# Patient Record
Sex: MALE | Race: WHITE | NOT HISPANIC OR LATINO | URBAN - METROPOLITAN AREA
[De-identification: names, ages, dates, MRNs, and addresses within clinical notes are randomized per-mention and may not be internally consistent; named-entity substitution may affect disease eponyms.]

---

## 2018-12-01 ENCOUNTER — INPATIENT (INPATIENT)
Facility: HOSPITAL | Age: 73
LOS: 8 days | Discharge: HOME | DRG: 330 | End: 2018-12-10
Attending: INTERNAL MEDICINE | Admitting: HOSPITALIST
Payer: MEDICARE

## 2018-12-01 ENCOUNTER — TRANSCRIPTION ENCOUNTER (OUTPATIENT)
Age: 73
End: 2018-12-01

## 2018-12-01 VITALS
DIASTOLIC BLOOD PRESSURE: 74 MMHG | HEART RATE: 84 BPM | SYSTOLIC BLOOD PRESSURE: 151 MMHG | WEIGHT: 197.09 LBS | HEIGHT: 71 IN | RESPIRATION RATE: 18 BRPM | TEMPERATURE: 98 F | OXYGEN SATURATION: 95 %

## 2018-12-01 DIAGNOSIS — R52 PAIN, UNSPECIFIED: ICD-10-CM

## 2018-12-01 DIAGNOSIS — I10 ESSENTIAL (PRIMARY) HYPERTENSION: ICD-10-CM

## 2018-12-01 DIAGNOSIS — R10.9 UNSPECIFIED ABDOMINAL PAIN: ICD-10-CM

## 2018-12-01 DIAGNOSIS — E11.9 TYPE 2 DIABETES MELLITUS WITHOUT COMPLICATIONS: ICD-10-CM

## 2018-12-01 DIAGNOSIS — E78.5 HYPERLIPIDEMIA, UNSPECIFIED: ICD-10-CM

## 2018-12-01 DIAGNOSIS — K63.89 OTHER SPECIFIED DISEASES OF INTESTINE: ICD-10-CM

## 2018-12-01 LAB
ALBUMIN SERPL ELPH-MCNC: 4.7 G/DL — SIGNIFICANT CHANGE UP (ref 3.3–5.2)
ALP SERPL-CCNC: 68 U/L — SIGNIFICANT CHANGE UP (ref 40–120)
ALT FLD-CCNC: 50 U/L — HIGH
ANION GAP SERPL CALC-SCNC: 12 MMOL/L — SIGNIFICANT CHANGE UP (ref 5–17)
APPEARANCE UR: CLEAR — SIGNIFICANT CHANGE UP
APTT BLD: 28.2 SEC — SIGNIFICANT CHANGE UP (ref 27.5–36.3)
AST SERPL-CCNC: 40 U/L — HIGH
BASOPHILS # BLD AUTO: 0 K/UL — SIGNIFICANT CHANGE UP (ref 0–0.2)
BASOPHILS NFR BLD AUTO: 0.2 % — SIGNIFICANT CHANGE UP (ref 0–2)
BILIRUB SERPL-MCNC: 0.5 MG/DL — SIGNIFICANT CHANGE UP (ref 0.4–2)
BILIRUB UR-MCNC: NEGATIVE — SIGNIFICANT CHANGE UP
BUN SERPL-MCNC: 19 MG/DL — SIGNIFICANT CHANGE UP (ref 8–20)
CALCIUM SERPL-MCNC: 9.5 MG/DL — SIGNIFICANT CHANGE UP (ref 8.6–10.2)
CHLORIDE SERPL-SCNC: 99 MMOL/L — SIGNIFICANT CHANGE UP (ref 98–107)
CO2 SERPL-SCNC: 25 MMOL/L — SIGNIFICANT CHANGE UP (ref 22–29)
COLOR SPEC: YELLOW — SIGNIFICANT CHANGE UP
CREAT SERPL-MCNC: 0.79 MG/DL — SIGNIFICANT CHANGE UP (ref 0.5–1.3)
DIFF PNL FLD: ABNORMAL
EOSINOPHIL # BLD AUTO: 0.3 K/UL — SIGNIFICANT CHANGE UP (ref 0–0.5)
EOSINOPHIL NFR BLD AUTO: 2.5 % — SIGNIFICANT CHANGE UP (ref 0–5)
GLUCOSE BLDC GLUCOMTR-MCNC: 165 MG/DL — HIGH (ref 70–99)
GLUCOSE SERPL-MCNC: 200 MG/DL — HIGH (ref 70–115)
GLUCOSE UR QL: 250 MG/DL
HCT VFR BLD CALC: 39.1 % — LOW (ref 42–52)
HGB BLD-MCNC: 13.6 G/DL — LOW (ref 14–18)
INR BLD: 1.02 RATIO — SIGNIFICANT CHANGE UP (ref 0.88–1.16)
KETONES UR-MCNC: ABNORMAL
LACTATE BLDV-MCNC: 1 MMOL/L — SIGNIFICANT CHANGE UP (ref 0.5–2)
LEUKOCYTE ESTERASE UR-ACNC: NEGATIVE — SIGNIFICANT CHANGE UP
LIDOCAIN IGE QN: 44 U/L — SIGNIFICANT CHANGE UP (ref 22–51)
LYMPHOCYTES # BLD AUTO: 1.7 K/UL — SIGNIFICANT CHANGE UP (ref 1–4.8)
LYMPHOCYTES # BLD AUTO: 15.8 % — LOW (ref 20–55)
MCHC RBC-ENTMCNC: 32.5 PG — HIGH (ref 27–31)
MCHC RBC-ENTMCNC: 34.8 G/DL — SIGNIFICANT CHANGE UP (ref 32–36)
MCV RBC AUTO: 93.5 FL — SIGNIFICANT CHANGE UP (ref 80–94)
MONOCYTES # BLD AUTO: 0.5 K/UL — SIGNIFICANT CHANGE UP (ref 0–0.8)
MONOCYTES NFR BLD AUTO: 4.6 % — SIGNIFICANT CHANGE UP (ref 3–10)
NEUTROPHILS # BLD AUTO: 8.4 K/UL — HIGH (ref 1.8–8)
NEUTROPHILS NFR BLD AUTO: 76.8 % — HIGH (ref 37–73)
NITRITE UR-MCNC: NEGATIVE — SIGNIFICANT CHANGE UP
PH UR: 6 — SIGNIFICANT CHANGE UP (ref 5–8)
PLATELET # BLD AUTO: 179 K/UL — SIGNIFICANT CHANGE UP (ref 150–400)
POTASSIUM SERPL-MCNC: 4.3 MMOL/L — SIGNIFICANT CHANGE UP (ref 3.5–5.3)
POTASSIUM SERPL-SCNC: 4.3 MMOL/L — SIGNIFICANT CHANGE UP (ref 3.5–5.3)
PROT SERPL-MCNC: 7.5 G/DL — SIGNIFICANT CHANGE UP (ref 6.6–8.7)
PROT UR-MCNC: 30 MG/DL
PROTHROM AB SERPL-ACNC: 11.7 SEC — SIGNIFICANT CHANGE UP (ref 10–12.9)
RBC # BLD: 4.18 M/UL — LOW (ref 4.6–6.2)
RBC # FLD: 12.4 % — SIGNIFICANT CHANGE UP (ref 11–15.6)
SODIUM SERPL-SCNC: 136 MMOL/L — SIGNIFICANT CHANGE UP (ref 135–145)
SP GR SPEC: 1.01 — SIGNIFICANT CHANGE UP (ref 1.01–1.02)
UROBILINOGEN FLD QL: NEGATIVE MG/DL — SIGNIFICANT CHANGE UP
WBC # BLD: 10.9 K/UL — HIGH (ref 4.8–10.8)
WBC # FLD AUTO: 10.9 K/UL — HIGH (ref 4.8–10.8)

## 2018-12-01 PROCEDURE — 74177 CT ABD & PELVIS W/CONTRAST: CPT | Mod: 26

## 2018-12-01 PROCEDURE — 44204 LAPARO PARTIAL COLECTOMY: CPT

## 2018-12-01 PROCEDURE — 99497 ADVNCD CARE PLAN 30 MIN: CPT | Mod: 25

## 2018-12-01 PROCEDURE — 99285 EMERGENCY DEPT VISIT HI MDM: CPT

## 2018-12-01 PROCEDURE — 99223 1ST HOSP IP/OBS HIGH 75: CPT

## 2018-12-01 RX ORDER — SODIUM CHLORIDE 9 MG/ML
1000 INJECTION, SOLUTION INTRAVENOUS
Qty: 0 | Refills: 0 | Status: DISCONTINUED | OUTPATIENT
Start: 2018-12-01 | End: 2018-12-03

## 2018-12-01 RX ORDER — GLUCAGON INJECTION, SOLUTION 0.5 MG/.1ML
1 INJECTION, SOLUTION SUBCUTANEOUS ONCE
Qty: 0 | Refills: 0 | Status: DISCONTINUED | OUTPATIENT
Start: 2018-12-01 | End: 2018-12-03

## 2018-12-01 RX ORDER — DEXTROSE 50 % IN WATER 50 %
25 SYRINGE (ML) INTRAVENOUS ONCE
Qty: 0 | Refills: 0 | Status: DISCONTINUED | OUTPATIENT
Start: 2018-12-01 | End: 2018-12-03

## 2018-12-01 RX ORDER — FENTANYL CITRATE 50 UG/ML
25 INJECTION INTRAVENOUS ONCE
Qty: 0 | Refills: 0 | Status: DISCONTINUED | OUTPATIENT
Start: 2018-12-01 | End: 2018-12-01

## 2018-12-01 RX ORDER — MORPHINE SULFATE 50 MG/1
2 CAPSULE, EXTENDED RELEASE ORAL
Qty: 0 | Refills: 0 | Status: DISCONTINUED | OUTPATIENT
Start: 2018-12-01 | End: 2018-12-03

## 2018-12-01 RX ORDER — ACETAMINOPHEN 500 MG
650 TABLET ORAL EVERY 6 HOURS
Qty: 0 | Refills: 0 | Status: DISCONTINUED | OUTPATIENT
Start: 2018-12-01 | End: 2018-12-03

## 2018-12-01 RX ORDER — SODIUM CHLORIDE 9 MG/ML
1000 INJECTION INTRAMUSCULAR; INTRAVENOUS; SUBCUTANEOUS
Qty: 0 | Refills: 0 | Status: DISCONTINUED | OUTPATIENT
Start: 2018-12-01 | End: 2018-12-03

## 2018-12-01 RX ORDER — DEXTROSE 50 % IN WATER 50 %
15 SYRINGE (ML) INTRAVENOUS ONCE
Qty: 0 | Refills: 0 | Status: DISCONTINUED | OUTPATIENT
Start: 2018-12-01 | End: 2018-12-03

## 2018-12-01 RX ORDER — DEXTROSE 50 % IN WATER 50 %
12.5 SYRINGE (ML) INTRAVENOUS ONCE
Qty: 0 | Refills: 0 | Status: DISCONTINUED | OUTPATIENT
Start: 2018-12-01 | End: 2018-12-03

## 2018-12-01 RX ORDER — DOCUSATE SODIUM 100 MG
100 CAPSULE ORAL THREE TIMES A DAY
Qty: 0 | Refills: 0 | Status: DISCONTINUED | OUTPATIENT
Start: 2018-12-01 | End: 2018-12-03

## 2018-12-01 RX ORDER — SODIUM CHLORIDE 9 MG/ML
3 INJECTION INTRAMUSCULAR; INTRAVENOUS; SUBCUTANEOUS ONCE
Qty: 0 | Refills: 0 | Status: COMPLETED | OUTPATIENT
Start: 2018-12-01 | End: 2018-12-01

## 2018-12-01 RX ORDER — ONDANSETRON 8 MG/1
4 TABLET, FILM COATED ORAL EVERY 6 HOURS
Qty: 0 | Refills: 0 | Status: DISCONTINUED | OUTPATIENT
Start: 2018-12-01 | End: 2018-12-03

## 2018-12-01 RX ORDER — INSULIN LISPRO 100/ML
VIAL (ML) SUBCUTANEOUS
Qty: 0 | Refills: 0 | Status: DISCONTINUED | OUTPATIENT
Start: 2018-12-01 | End: 2018-12-03

## 2018-12-01 RX ADMIN — MORPHINE SULFATE 2 MILLIGRAM(S): 50 CAPSULE, EXTENDED RELEASE ORAL at 21:33

## 2018-12-01 RX ADMIN — FENTANYL CITRATE 25 MICROGRAM(S): 50 INJECTION INTRAVENOUS at 17:04

## 2018-12-01 RX ADMIN — SODIUM CHLORIDE 3 MILLILITER(S): 9 INJECTION INTRAMUSCULAR; INTRAVENOUS; SUBCUTANEOUS at 16:01

## 2018-12-01 RX ADMIN — SODIUM CHLORIDE 100 MILLILITER(S): 9 INJECTION INTRAMUSCULAR; INTRAVENOUS; SUBCUTANEOUS at 19:14

## 2018-12-01 RX ADMIN — FENTANYL CITRATE 25 MICROGRAM(S): 50 INJECTION INTRAVENOUS at 16:13

## 2018-12-01 RX ADMIN — MORPHINE SULFATE 2 MILLIGRAM(S): 50 CAPSULE, EXTENDED RELEASE ORAL at 21:45

## 2018-12-01 RX ADMIN — SODIUM CHLORIDE 100 MILLILITER(S): 9 INJECTION INTRAMUSCULAR; INTRAVENOUS; SUBCUTANEOUS at 16:13

## 2018-12-01 RX ADMIN — FENTANYL CITRATE 25 MICROGRAM(S): 50 INJECTION INTRAVENOUS at 19:14

## 2018-12-01 RX ADMIN — Medication 100 MILLIGRAM(S): at 21:33

## 2018-12-01 NOTE — CONSULT NOTE ADULT - ASSESSMENT
74 y/o M h/o DM, HTN, gluten allergy with abdominal pain and CT finding concerning for possible colon mass. Hemodynamically stable, afebrile. Patient is admitted to medical service for abdominal pain and metastatic work-up.    Plan:  Plan for colonoscopy Monday 12/3 with Dr. Fields  Bowel prep with golytely, clear liquid diet 12/2  We are requesting cardiac clearance for colonoscopy  CEA  Rest of medical management as per primary team  Colorectal surgery will continue to follow    Discussed with colorectal surgery attending Dr. Fields.

## 2018-12-01 NOTE — ED ADULT NURSE NOTE - NSIMPLEMENTINTERV_GEN_ALL_ED
Implemented All Universal Safety Interventions:  [place] to call system. Call bell, personal items and telephone within reach. Instruct patient to call for assistance. Room bathroom lighting operational. Non-slip footwear when patient is off stretcher. Physically safe environment: no spills, clutter or unnecessary equipment. Stretcher in lowest position, wheels locked, appropriate side rails in place.

## 2018-12-01 NOTE — H&P ADULT - ASSESSMENT
72 yo male with hld, htn, bph, who is admitted for intractable abdominal pain in setting of new abdominal mass concerning for neoplastic etiology

## 2018-12-01 NOTE — ED PROVIDER NOTE - PROGRESS NOTE DETAILS
CT SIG RIGH COLONIC MASS  CR SURGERY CALLED TO SEE PT  RESULTS REVIEWED WITH PT AND HIS DAUGHTER    CONTINUE NPO AND IVF AND PAIN MEDS

## 2018-12-01 NOTE — ED PROVIDER NOTE - CONSTITUTIONAL, MLM
normal... Well appearing, well nourished, awake, alert, oriented to person, place, time/situation and in MILD apparent distress.

## 2018-12-01 NOTE — H&P ADULT - FAMILY HISTORY
No pertinent family history in first degree relatives No pertinent family history in first degree relatives, no known family history of colon or other cancers in mother or father. No known medical history of mother or father

## 2018-12-01 NOTE — H&P ADULT - HISTORY OF PRESENT ILLNESS
Pt is a 72 yo male with a pmh/o HLD, DMII on metformin, BPH, HTN, GIB with hemmorhoids, who presents to ED c/o acute onset sharp abdominal pain that radiated from central abdomen to RLQ and began at 11pm. Pt states he has never had pain before or similar to other than as a child when his appendix was removed. Pt c/o minimal nausea this AM but otherwise has had no brbpr, hematemesis, palpitations, constipation, diarrhea, cp, HA.

## 2018-12-01 NOTE — ED PROVIDER NOTE - PMH
Hyperlipidemia, unspecified hyperlipidemia type    Hypertension, unspecified type    Type 2 diabetes mellitus without complication, with long-term current use of insulin

## 2018-12-01 NOTE — ED PROVIDER NOTE - OBJECTIVE STATEMENT
PT WITH C/C ABDOMINAL PAIN  72 YO MALE FROM UC FOR ABDOMINAL PAIN. SUDDEN ONSET EARLY THIS AM RLQ PAIN. DIFFICULT TO SLEEP. NAUSEA. NO APPETITE. NO FEVER OR CHILLS . NO  SYMPTOMS. PAIN CONSTANT EPIGASTRIC  AND RLQ. SEVERE ACHE DESCRIPTION. + RADIATION RIGHT SIDE  MED HX DM HTN HLD

## 2018-12-01 NOTE — ED ADULT TRIAGE NOTE - CHIEF COMPLAINT QUOTE
Pt ambulatory in ED c/o diffuse abdominal pain since last night, went to urgent care today and sent to ED for CT scan.

## 2018-12-01 NOTE — ED STATDOCS - OBJECTIVE STATEMENT
Telemedicine assessment was conducted (using real time 2 way audio-video technology) by Dr. MARI Gu located at [address] [place] [address]  ++++++++++++++++++++++++  Pertinent patient history and initial plan:    72 y/o M pt with hx of HTN, HLD and DM presents to ED c/o severe RLQ ABD pain, which radiates diffusely to his ABD, since last night. He presented to urgent care today; and was told to present to ED to r/o appendicitis due to guarding on exam. Endorses mild nausea. Denies fever or vomiting. Telemedicine assessment was conducted (using real time 2 way audio-video technology) by Dr. MARI Gu located at [address] [place] [address]  ++++++++++++++++++++++++  Pertinent patient history and initial plan:    74 y/o M pt with hx of HTN, HLD and DM presents to ED c/o severe RLQ ABD pain, which radiates diffusely to his ABD, since last night. He presented to urgent care today; and was told to present to ED to r/o appendicitis due to guarding on exam. Endorses mild nausea. Denies fever or vomiting. when daughter presses on rlq, the patient guards. Patient seen by me in intake for initial assessment and ordering. Physician on site to follow results and further evaluate and treat patient.

## 2018-12-01 NOTE — CONSULT NOTE ADULT - SUBJECTIVE AND OBJECTIVE BOX
ACUTE CARE SURGERY CONSULT    Consulting surgical team: [facility] - Acute Care Surgery  Consulting attending: Dr. Radha Fields  Patient seen and examined: 18 @ 00:21    HPI:  74 y/o M h/o DM, HTN, gluten allergy presents to ED with right sided abdominal pain. Patient states pain started this morning and woke him from sleep. Pain has been constant. Associated nausea but no vomiting. Medications did not alleviate pain at home. Bowel movement this morning, flatus yesterday. Last colonoscopy 4-5 years ago, reportedly normal per patient. No fever, chills, chest pain, dyspnea. No recent weight loss. No headache, dizziness, weakness, numbness, or fatigue. No blood per rectum.    PMH: DM, HTN, gluten allergy  PSH: appendectomy (age 17)  Medications: does not recall, takes medications "for blood pressure, diabetes, and heart"  Allergies: NKDA, gluten  Social: denies toxic habits x3    MEDICATIONS  (STANDING):  dextrose 5%. 1000 milliLiter(s) (50 mL/Hr) IV Continuous <Continuous>  dextrose 50% Injectable 12.5 Gram(s) IV Push once  dextrose 50% Injectable 25 Gram(s) IV Push once  dextrose 50% Injectable 25 Gram(s) IV Push once  docusate sodium 100 milliGRAM(s) Oral three times a day  insulin lispro (HumaLOG) corrective regimen sliding scale   SubCutaneous three times a day before meals  sodium chloride 0.9%. 1000 milliLiter(s) (100 mL/Hr) IV Continuous <Continuous>  sodium chloride 0.9%. 1000 milliLiter(s) (100 mL/Hr) IV Continuous <Continuous>    MEDICATIONS  (PRN):  acetaminophen   Tablet .. 650 milliGRAM(s) Oral every 6 hours PRN Temp greater or equal to 38C (100.4F), Moderate Pain (4 - 6)  dextrose 40% Gel 15 Gram(s) Oral once PRN Blood Glucose LESS THAN 70 milliGRAM(s)/deciliter  glucagon  Injectable 1 milliGRAM(s) IntraMuscular once PRN Glucose LESS THAN 70 milligrams/deciliter  morphine  - Injectable 2 milliGRAM(s) IV Push four times a day PRN Severe Pain (7 - 10)  ondansetron Injectable 4 milliGRAM(s) IV Push every 6 hours PRN Nausea    VITALS & I/Os:  Vital Signs Last 24 Hrs  T(C): 36.7 (02 Dec 2018 00:07), Max: 37.1 (01 Dec 2018 21:26)  T(F): 98.1 (02 Dec 2018 00:07), Max: 98.7 (01 Dec 2018 21:26)  HR: 76 (02 Dec 2018 00:07) (71 - 84)  BP: 154/70 (02 Dec 2018 00:07) (151/74 - 156/81)  BP(mean): --  RR: 18 (02 Dec 2018 00:07) (18 - 18)  SpO2: 96% (02 Dec 2018 00:07) (95% - 97%)  CAPILLARY BLOOD GLUCOSE      POCT Blood Glucose.: 165 mg/dL (01 Dec 2018 22:27)        General: NAD, AOx3, comfortable on examination  HEENT: PERRLA, EOMI, moist mucous membranes  Neck: supple, nontender  Cardiovascular: heart RRR, no murmurs  Respiratory: no respiratory distress, CTAB  Abdomen: soft, mild RLQ tenderness, nondistended. No guarding or rebound. Normal bowel sounds.  Extremities: no peripheral edema. Normal ROM.  Integumentary: warm, no rash  Neuro: no motor or sensory deficits        LABS:                        13.6   10.9  )-----------( 179      ( 01 Dec 2018 16:08 )             39.1     12    136  |  99  |  19.0  ----------------------------<  200<H>  4.3   |  25.0  |  0.79    Ca    9.5      01 Dec 2018 16:08    TPro  7.5  /  Alb  4.7  /  TBili  0.5  /  DBili  x   /  AST  40<H>  /  ALT  50<H>  /  AlkPhos  68  12      PT/INR - ( 01 Dec 2018 16:08 )   PT: 11.7 sec;   INR: 1.02 ratio         PTT - ( 01 Dec 2018 16:08 )  PTT:28.2 sec         @ 19:31  1.0    Urinalysis Basic - ( 01 Dec 2018 16:11 )    Color: Yellow / Appearance: Clear / S.015 / pH: x  Gluc: x / Ketone: Trace  / Bili: Negative / Urobili: Negative mg/dL   Blood: x / Protein: 30 mg/dL / Nitrite: Negative   Leuk Esterase: Negative / RBC: 0-2 /HPF / WBC 0-2   Sq Epi: x / Non Sq Epi: Occasional / Bacteria: Occasional        IMAGING:  < from: CT Abdomen and Pelvis w/ Oral Cont and w/ IV Cont (18 @ 17:32) >   EXAM:  CT ABDOMEN AND PELVIS OC IC                          PROCEDURE DATE:  2018          INTERPRETATION:      Contrast enhanced CT of the abdomen and pelvis .  COMPARISON:    .    CLINICAL HISTORY:    .    Technique: contiguous axial images were obtained with 2.5 mm slice   thickness after intravenous and oral contrast administration.  Coronal and sagittal reformats were also submitted for interpretation.    100 ml of Omnipaque were injected intravenously, and 0 ml were discarded,   without complications noted.     FINDINGS:   There is a fecalization of material within the ileum to the level of the   ileocecal valve. The distal RIGHT colon shows diffuse thickening.   Carcinoma should be considered .  Thickened RIGHT colon displayedon coronal images 47 through 68 series 3   the appendix is normal.  The distal small bowel is collapsed . contrast ingested is seen within   nondilated jejunum .  The stomach and duodenal sweep are unremarkable.    The lung bases are clear.     The visualized portions of the heart are normal.    There is no free intra-abdominal air or ascites.    The liver, spleen, pancreas, adrenal glands, gallbladder are normal.    There is no intra or extrahepatic biliary ductal dilatation.      Both kidneys show normal uptake of contrast media without masses or   hydronephrosis.      The urinary bladder shows normal morphology and contour.      Prostate and seminal vesicles within normal limits.    There are no retroperitoneal masses or abnormal lymphadenopathy.  The retroperitoneal vessels are normal.      The bones and soft tissues are intact.    IMPRESSION:     Obstructing the proximal RIGHT colon lesion concerning for carcinoma   causing  dilated ileum with fecalization of content as described.       ANY DOUGLASS M.D., ATTENDING RADIOLOGIST  This document has been electronically signed. Dec  1 2018  6:18PM    < end of copied text >

## 2018-12-01 NOTE — H&P ADULT - PROBLEM SELECTOR PLAN 1
due to new finding of likely neoplastic mass  admit to any bed  pain control with opioids and bowel regimen   strict i/o's  colorectal service called by ED- colonoscopy monday? will consult again in AM to confirm  IVF for gentle hydration  clear liquid diet

## 2018-12-02 ENCOUNTER — TRANSCRIPTION ENCOUNTER (OUTPATIENT)
Age: 73
End: 2018-12-02

## 2018-12-02 DIAGNOSIS — K35.890 OTHER ACUTE APPENDICITIS WITHOUT PERFORATION OR GANGRENE: Chronic | ICD-10-CM

## 2018-12-02 LAB
ALBUMIN SERPL ELPH-MCNC: 4.2 G/DL — SIGNIFICANT CHANGE UP (ref 3.3–5.2)
ALP SERPL-CCNC: 57 U/L — SIGNIFICANT CHANGE UP (ref 40–120)
ALT FLD-CCNC: 38 U/L — SIGNIFICANT CHANGE UP
ANION GAP SERPL CALC-SCNC: 11 MMOL/L — SIGNIFICANT CHANGE UP (ref 5–17)
AST SERPL-CCNC: 33 U/L — SIGNIFICANT CHANGE UP
BASOPHILS # BLD AUTO: 0 K/UL — SIGNIFICANT CHANGE UP (ref 0–0.2)
BASOPHILS NFR BLD AUTO: 0.1 % — SIGNIFICANT CHANGE UP (ref 0–2)
BILIRUB SERPL-MCNC: 0.6 MG/DL — SIGNIFICANT CHANGE UP (ref 0.4–2)
BLD GP AB SCN SERPL QL: SIGNIFICANT CHANGE UP
BUN SERPL-MCNC: 15 MG/DL — SIGNIFICANT CHANGE UP (ref 8–20)
CALCIUM SERPL-MCNC: 8.7 MG/DL — SIGNIFICANT CHANGE UP (ref 8.6–10.2)
CEA SERPL-MCNC: 1.2 NG/ML — SIGNIFICANT CHANGE UP (ref 0–3.8)
CHLORIDE SERPL-SCNC: 98 MMOL/L — SIGNIFICANT CHANGE UP (ref 98–107)
CO2 SERPL-SCNC: 24 MMOL/L — SIGNIFICANT CHANGE UP (ref 22–29)
CREAT SERPL-MCNC: 0.68 MG/DL — SIGNIFICANT CHANGE UP (ref 0.5–1.3)
EOSINOPHIL # BLD AUTO: 0.2 K/UL — SIGNIFICANT CHANGE UP (ref 0–0.5)
EOSINOPHIL NFR BLD AUTO: 2.6 % — SIGNIFICANT CHANGE UP (ref 0–5)
GLUCOSE BLDC GLUCOMTR-MCNC: 148 MG/DL — HIGH (ref 70–99)
GLUCOSE BLDC GLUCOMTR-MCNC: 195 MG/DL — HIGH (ref 70–99)
GLUCOSE BLDC GLUCOMTR-MCNC: 196 MG/DL — HIGH (ref 70–99)
GLUCOSE SERPL-MCNC: 177 MG/DL — HIGH (ref 70–115)
HBA1C BLD-MCNC: 6.4 % — HIGH (ref 4–5.6)
HCT VFR BLD CALC: 37.8 % — LOW (ref 42–52)
HGB BLD-MCNC: 13 G/DL — LOW (ref 14–18)
LYMPHOCYTES # BLD AUTO: 1.5 K/UL — SIGNIFICANT CHANGE UP (ref 1–4.8)
LYMPHOCYTES # BLD AUTO: 16.5 % — LOW (ref 20–55)
MAGNESIUM SERPL-MCNC: 1.7 MG/DL — SIGNIFICANT CHANGE UP (ref 1.6–2.6)
MCHC RBC-ENTMCNC: 32 PG — HIGH (ref 27–31)
MCHC RBC-ENTMCNC: 34.4 G/DL — SIGNIFICANT CHANGE UP (ref 32–36)
MCV RBC AUTO: 93.1 FL — SIGNIFICANT CHANGE UP (ref 80–94)
MONOCYTES # BLD AUTO: 0.6 K/UL — SIGNIFICANT CHANGE UP (ref 0–0.8)
MONOCYTES NFR BLD AUTO: 6.1 % — SIGNIFICANT CHANGE UP (ref 3–10)
NEUTROPHILS # BLD AUTO: 6.8 K/UL — SIGNIFICANT CHANGE UP (ref 1.8–8)
NEUTROPHILS NFR BLD AUTO: 74.5 % — HIGH (ref 37–73)
PHOSPHATE SERPL-MCNC: 2.8 MG/DL — SIGNIFICANT CHANGE UP (ref 2.4–4.7)
PLATELET # BLD AUTO: 174 K/UL — SIGNIFICANT CHANGE UP (ref 150–400)
POTASSIUM SERPL-MCNC: 4 MMOL/L — SIGNIFICANT CHANGE UP (ref 3.5–5.3)
POTASSIUM SERPL-SCNC: 4 MMOL/L — SIGNIFICANT CHANGE UP (ref 3.5–5.3)
PROT SERPL-MCNC: 6.5 G/DL — LOW (ref 6.6–8.7)
RBC # BLD: 4.06 M/UL — LOW (ref 4.6–6.2)
RBC # FLD: 12.3 % — SIGNIFICANT CHANGE UP (ref 11–15.6)
SODIUM SERPL-SCNC: 133 MMOL/L — LOW (ref 135–145)
TYPE + AB SCN PNL BLD: SIGNIFICANT CHANGE UP
WBC # BLD: 9.2 K/UL — SIGNIFICANT CHANGE UP (ref 4.8–10.8)
WBC # FLD AUTO: 9.2 K/UL — SIGNIFICANT CHANGE UP (ref 4.8–10.8)

## 2018-12-02 PROCEDURE — 99232 SBSQ HOSP IP/OBS MODERATE 35: CPT

## 2018-12-02 PROCEDURE — 71250 CT THORAX DX C-: CPT | Mod: 26

## 2018-12-02 RX ORDER — SOD SULF/SODIUM/NAHCO3/KCL/PEG
4000 SOLUTION, RECONSTITUTED, ORAL ORAL ONCE
Qty: 0 | Refills: 0 | Status: DISCONTINUED | OUTPATIENT
Start: 2018-12-02 | End: 2018-12-02

## 2018-12-02 RX ORDER — ATORVASTATIN CALCIUM 80 MG/1
40 TABLET, FILM COATED ORAL AT BEDTIME
Qty: 0 | Refills: 0 | Status: DISCONTINUED | OUTPATIENT
Start: 2018-12-02 | End: 2018-12-03

## 2018-12-02 RX ORDER — LISINOPRIL 2.5 MG/1
2.5 TABLET ORAL DAILY
Qty: 0 | Refills: 0 | Status: DISCONTINUED | OUTPATIENT
Start: 2018-12-02 | End: 2018-12-03

## 2018-12-02 RX ORDER — METOPROLOL TARTRATE 50 MG
25 TABLET ORAL
Qty: 0 | Refills: 0 | Status: DISCONTINUED | OUTPATIENT
Start: 2018-12-02 | End: 2018-12-03

## 2018-12-02 RX ORDER — TAMSULOSIN HYDROCHLORIDE 0.4 MG/1
0.4 CAPSULE ORAL AT BEDTIME
Qty: 0 | Refills: 0 | Status: DISCONTINUED | OUTPATIENT
Start: 2018-12-02 | End: 2018-12-03

## 2018-12-02 RX ADMIN — MORPHINE SULFATE 2 MILLIGRAM(S): 50 CAPSULE, EXTENDED RELEASE ORAL at 02:24

## 2018-12-02 RX ADMIN — MORPHINE SULFATE 2 MILLIGRAM(S): 50 CAPSULE, EXTENDED RELEASE ORAL at 06:20

## 2018-12-02 RX ADMIN — Medication 2: at 17:30

## 2018-12-02 RX ADMIN — Medication 100 MILLIGRAM(S): at 05:56

## 2018-12-02 RX ADMIN — TAMSULOSIN HYDROCHLORIDE 0.4 MILLIGRAM(S): 0.4 CAPSULE ORAL at 23:55

## 2018-12-02 RX ADMIN — MORPHINE SULFATE 2 MILLIGRAM(S): 50 CAPSULE, EXTENDED RELEASE ORAL at 06:35

## 2018-12-02 RX ADMIN — SODIUM CHLORIDE 100 MILLILITER(S): 9 INJECTION INTRAMUSCULAR; INTRAVENOUS; SUBCUTANEOUS at 12:55

## 2018-12-02 RX ADMIN — Medication 100 MILLIGRAM(S): at 13:01

## 2018-12-02 RX ADMIN — LISINOPRIL 2.5 MILLIGRAM(S): 2.5 TABLET ORAL at 05:56

## 2018-12-02 RX ADMIN — Medication 2: at 13:00

## 2018-12-02 RX ADMIN — MORPHINE SULFATE 2 MILLIGRAM(S): 50 CAPSULE, EXTENDED RELEASE ORAL at 14:00

## 2018-12-02 RX ADMIN — MORPHINE SULFATE 2 MILLIGRAM(S): 50 CAPSULE, EXTENDED RELEASE ORAL at 12:55

## 2018-12-02 RX ADMIN — Medication 100 MILLIGRAM(S): at 23:55

## 2018-12-02 RX ADMIN — MORPHINE SULFATE 2 MILLIGRAM(S): 50 CAPSULE, EXTENDED RELEASE ORAL at 02:39

## 2018-12-02 RX ADMIN — Medication 25 MILLIGRAM(S): at 18:33

## 2018-12-02 RX ADMIN — ATORVASTATIN CALCIUM 40 MILLIGRAM(S): 80 TABLET, FILM COATED ORAL at 23:55

## 2018-12-02 NOTE — PROGRESS NOTE ADULT - ASSESSMENT
74 yo male with hld, htn, bph, who is admitted for intractable abdominal pain in setting of new abdominal mass concerning for neoplastic etiology    1. abd pain sec to colon mass- Colono on Monday by CRS. pain meds. bowel regimen. CEA.  - ECHo for Cards optimization. BP in 140s to 150s range. low dose ACEi added on admission no adding BB with hold parameters.  - CT chest- non acute. tail of pancreas nodule  2. DM2, HLD, HTN- Cont insulin regimen. BP control as above.  COnt rest of meds    ICDs

## 2018-12-02 NOTE — PROGRESS NOTE ADULT - SUBJECTIVE AND OBJECTIVE BOX
Patient c/o nausea today. Passing flatus, denies bms    Vital Signs Last 24 Hrs  T(C): 37 (02 Dec 2018 05:25), Max: 37.1 (01 Dec 2018 21:26)  T(F): 98.6 (02 Dec 2018 05:25), Max: 98.7 (01 Dec 2018 21:26)  HR: 70 (02 Dec 2018 05:25) (70 - 84)  BP: 150/70 (02 Dec 2018 05:25) (150/70 - 156/81)  RR: 18 (02 Dec 2018 05:25) (18 - 18)  SpO2: 96% (02 Dec 2018 05:25) (95% - 97%)    abd distended, no guarding, mild tenderness                          13.0   9.2   )-----------( 174      ( 02 Dec 2018 07:04 )             37.8   12-02    133<L>  |  98  |  15.0  ----------------------------<  177<H>  4.0   |  24.0  |  0.68    Ca    8.7      02 Dec 2018 07:06  Phos  2.8     12-02  Mg     1.7     12-02    TPro  6.5<L>  /  Alb  4.2  /  TBili  0.6  /  DBili  x   /  AST  33  /  ALT  38  /  AlkPhos  57  12-02

## 2018-12-02 NOTE — PROGRESS NOTE ADULT - ASSESSMENT
73M w/ 1 week hx of abd distention and diarrhea found to have colon mass on ct. Plan was to perform colonoscopy however, patient is very nauseous and only passing flatus at this time. I feel that it is safe to operative relieve the obstruction. I spoke with the patient and his daughter, Rosario, regarding the risks and benefits of surgery including infection, bleeding and injury to surrounding structures. The patient and family are agreeable at this time. 73M w/ 1 week hx of abd distention and diarrhea found to have colon mass on ct. Plan was to perform colonoscopy however, patient is very nauseous and only passing flatus at this time. I feel that it is safe to operative relieve the obstruction. He is unable to tolerate PO bowel prep. I spoke with the patient and his daughter, Rosario, regarding the risks and benefits of surgery including infection, bleeding and injury to surrounding structures. The patient and family are agreeable at this time.

## 2018-12-02 NOTE — PROGRESS NOTE ADULT - SUBJECTIVE AND OBJECTIVE BOX
HEALTH ISSUES - PROBLEM Dx:    CC- abd pain, colon mass,     INTERVAL HPI/ OVERNIGHT EVENTS:  pain controlled with meds  no new complaints      REVIEW OF SYSTEMS:    Abd pain + N/V -ve fever -ve    Vital Signs Last 24 Hrs  T(C): 37 (02 Dec 2018 05:25), Max: 37.1 (01 Dec 2018 21:26)  T(F): 98.6 (02 Dec 2018 05:25), Max: 98.7 (01 Dec 2018 21:26)  HR: 70 (02 Dec 2018 05:25) (70 - 84)  BP: 150/70 (02 Dec 2018 05:25) (150/70 - 156/81)  BP(mean): --  RR: 18 (02 Dec 2018 05:25) (18 - 18)  SpO2: 96% (02 Dec 2018 05:25) (95% - 97%)    PHYSICAL EXAM-  General: NAD, AOx3, comfortable on examination  HEENT: PERRLA, EOMI, moist mucous membranes  Neck: supple, nontender  Cardiovascular: heart RRR, no murmurs  Respiratory: no respiratory distress, CTAB  Abdomen: soft, mild RLQ tenderness, nondistended. No guarding or rebound. Normal bowel sounds.  Extremities: no peripheral edema. Normal ROM.  Integumentary: warm, no rash  Neuro: no motor or sensory deficits    LABS:                        13.0   9.2   )-----------( 174      ( 02 Dec 2018 07:04 )             37.8     12-02    133<L>  |  98  |  15.0  ----------------------------<  177<H>  4.0   |  24.0  |  0.68    Ca    8.7      02 Dec 2018 07:06  Phos  2.8     12-02  Mg     1.7     12-02    TPro  6.5<L>  /  Alb  4.2  /  TBili  0.6  /  DBili  x   /  AST  33  /  ALT  38  /  AlkPhos  57  12-02    PT/INR - ( 01 Dec 2018 16:08 )   PT: 11.7 sec;   INR: 1.02 ratio         PTT - ( 01 Dec 2018 16:08 )  PTT:28.2 sec  Urinalysis Basic - ( 01 Dec 2018 16:11 )    Color: Yellow / Appearance: Clear / S.015 / pH: x  Gluc: x / Ketone: Trace  / Bili: Negative / Urobili: Negative mg/dL   Blood: x / Protein: 30 mg/dL / Nitrite: Negative   Leuk Esterase: Negative / RBC: 0-2 /HPF / WBC 0-2   Sq Epi: x / Non Sq Epi: Occasional / Bacteria: Occasional    Assessment and Plan

## 2018-12-02 NOTE — CONSULT NOTE ADULT - ASSESSMENT
Pt is a 72 yo male with a pmh of HLD, DMII on metformin, BPH, HTN, GIB with hemmorhoids, who presents to ED c/o acute onset sharp abdominal pain that radiated from central abdomen to RLQ and began at 11pm. Pt states he has never had pain before or similar to other than as a child when his appendix was removed. Pt c/o minimal nausea this AM but otherwise has had no brbpr, hematemesis, palpitations, constipation, diarrhea, cp, HA.  CT finding concerning for possible RT colonic mass. Hemodynamically stable, afebrile. Patient is admitted to medical service for abdominal pain and metastatic work-up. CV team called for pre-colonoscopy eval. On exam hemodynamically stable w/o signs of HF. Patient denies prior MI/stent or Cv surgery. ECG SR w/o acute ischemic changes.    Based on RCRI risk index patient is at low-intermediate risk of MACE for planned low risk procedure. The patient is currently optimized from a cardiac standpoint.    Recc;  2-D echo for baseline assessment  IV lopressor 2.5-5mg mg q 4hrs samuel-procedure for HR/BP control  keep K>4 mg > 2  Start Lopressor po 25mg po q 12 with hold parameters. Pt is a 74 yo male with a pmh of HLD, DMII on metformin, BPH, HTN, GIB with hemmorhoids, who presents to ED c/o acute onset sharp abdominal pain that radiated from central abdomen to RLQ and began at 11pm. Pt states he has never had pain before or similar to other than as a child when his appendix was removed. Pt c/o minimal nausea this AM but otherwise has had no brbpr, hematemesis, palpitations, constipation, diarrhea, cp, HA.  CT finding concerning for possible RT colonic mass. Hemodynamically stable, afebrile. Patient is admitted to medical service for abdominal pain and metastatic work-up. CV team called for pre-colonoscopy eval. On exam hemodynamically stable w/o signs of HF. Patient denies prior MI/stent or Cv surgery.     Based on RCRI risk index patient is at low-intermediate risk of MACE for planned low risk procedure. The patient is currently optimized from a cardiac standpoint.    Recc;  2-D echo for baseline assessment, 12lead ECG if wnl no additional testing needed  IV lopressor 2.5-5mg mg q 4hrs samuel-procedure for HR/BP control  keep K>4 mg > 2  Start Lopressor po 25mg po q 12 with hold parameters.

## 2018-12-02 NOTE — CONSULT NOTE ADULT - SUBJECTIVE AND OBJECTIVE BOX
[facility]                                                        [facility]                                                             Office: [address] [address]                                                            Telephone: [phone]. Fax:[phone]        Patient is a 73y old  Male who presents with a chief complaint of abdominal pain (01 Dec 2018 20:16)      HPI:  Pt is a 74 yo male with a pmh ofHLD, DMII on metformin, BPH, HTN, GIB with hemmorhoids, who presents to ED c/o acute onset sharp abdominal pain that radiated from central abdomen to RLQ and began at 11pm. Pt states he has never had pain before or similar to other than as a child when his appendix was removed. Pt c/o minimal nausea this AM but otherwise has had no brbpr, hematemesis, palpitations, constipation, diarrhea, cp, HA.  CT finding concerning for possible RTlon mass. Hemodynamically stable, afebrile. Patient is admitted to medical service for abdominal pain and metastatic work-up. CV team called for pre-colonoscopy eval.        Patient denies orthopnea PND,LE edema , syncope or pre-syncope  Functional status > 10 mets  No limitations with AODL  Co-morbid: (-) DM, (_) CVA, (_) COPD (-) PE (-) DVT (-) Bleeding or clotting disorders  ECG: SR with ns st twa  no phenotypic evidence of Brs, HCM ,LQTS      PAST MEDICAL & SURGICAL HISTORY:  Hyperlipidemia, unspecified hyperlipidemia type  Hypertension, unspecified type  Type 2 diabetes mellitus without complication, with long-term current use of insulin  Other acute appendicitis      PREVIOUS DIAGNOSTIC TESTING:      < from: CT Abdomen and Pelvis w/ Oral Cont and w/ IV Cont (18 @ 17:32) >  IMPRESSION:     Obstructing the proximal RIGHT colon lesion concerning for carcinoma   causing  dilated ileum with fecalization of content as described.     < end of copied text >          MEDICATIONS  (STANDING):  atorvastatin 40 milliGRAM(s) Oral at bedtime  dextrose 5%. 1000 milliLiter(s) (50 mL/Hr) IV Continuous <Continuous>  dextrose 50% Injectable 12.5 Gram(s) IV Push once  dextrose 50% Injectable 25 Gram(s) IV Push once  dextrose 50% Injectable 25 Gram(s) IV Push once  docusate sodium 100 milliGRAM(s) Oral three times a day  insulin lispro (HumaLOG) corrective regimen sliding scale   SubCutaneous three times a day before meals  lisinopril 2.5 milliGRAM(s) Oral daily  sodium chloride 0.9%. 1000 milliLiter(s) (100 mL/Hr) IV Continuous <Continuous>  sodium chloride 0.9%. 1000 milliLiter(s) (100 mL/Hr) IV Continuous <Continuous>  tamsulosin 0.4 milliGRAM(s) Oral at bedtime    MEDICATIONS  (PRN):  acetaminophen   Tablet .. 650 milliGRAM(s) Oral every 6 hours PRN Temp greater or equal to 38C (100.4F), Moderate Pain (4 - 6)  dextrose 40% Gel 15 Gram(s) Oral once PRN Blood Glucose LESS THAN 70 milliGRAM(s)/deciliter  glucagon  Injectable 1 milliGRAM(s) IntraMuscular once PRN Glucose LESS THAN 70 milligrams/deciliter  morphine  - Injectable 2 milliGRAM(s) IV Push four times a day PRN Severe Pain (7 - 10)  ondansetron Injectable 4 milliGRAM(s) IV Push every 6 hours PRN Nausea      FAMILY HISTORY:  No pertinent family history in first degree relatives: no known family history of colon or other cancers in mother or father. No known medical history of mother or father      SOCIAL HISTORY:Lives with family    CIGARETTES:None    ALCOHOL:None    REVIEW OF SYSTEMS:  CONSTITUTIONAL: No fever, weight loss, or fatigue  EYES: No eye pain, visual disturbances, or discharge  ENMT:  No difficulty hearing, tinnitus, vertigo; No sinus or throat pain  NECK: No pain or stiffness  RESPIRATORY: No cough, wheezing, chills or hemoptysis; No Shortness of Breath  CARDIOVASCULAR: No chest pain, palpitations, passing out, dizziness, or leg swelling  GASTROINTESTINAL: No abdominal or epigastric pain. No nausea, vomiting, or hematemesis; No diarrhea or constipation. No melena or hematochezia.  GENITOURINARY: No dysuria, frequency, hematuria, or incontinence  NEUROLOGICAL: No headaches, memory loss, loss of strength, numbness, or tremors  SKIN: No itching, burning, rashes, or lesions   LYMPH Nodes: No enlarged glands  ENDOCRINE: No heat or cold intolerance; No hair loss  MUSCULOSKELETAL: No joint pain or swelling; No muscle, back, or extremity pain  PSYCHIATRIC: No depression, anxiety, mood swings, or difficulty sleeping  HEME/LYMPH: No easy bruising, or bleeding gums  ALLERY AND IMMUNOLOGIC: No hives or eczema	    Vital Signs Last 24 Hrs  T(C): 37 (02 Dec 2018 05:25), Max: 37.1 (01 Dec 2018 21:26)  T(F): 98.6 (02 Dec 2018 05:25), Max: 98.7 (01 Dec 2018 21:26)  HR: 70 (02 Dec 2018 05:25) (70 - 84)  BP: 150/70 (02 Dec 2018 05:25) (150/70 - 156/81)  BP(mean): --  RR: 18 (02 Dec 2018 05:25) (18 - 18)  SpO2: 96% (02 Dec 2018 05:25) (95% - 97%)    Daily Height in cm: 180.34 (01 Dec 2018 14:38)    Daily     I&O's Detail      PHYSICAL EXAM:  Appearance: Normal, well nourished	  HEENT:   Normal oral mucosa, PERRL, EOMI, sclera non-icteric	  Lymphatic: No cervical lymphadenopathy  Cardiovascular: Normal S1 S2, No JVD, No cardiac murmurs, No carotid bruits, No peripheral edema  Respiratory: Lungs clear to auscultation	  Psychiatry: A & O x 3, Mood & affect appropriate  Gastrointestinal:  Soft, Non-tender, + BS, no bruits	  Skin: No rashes, No ecchymoses, No cyanosis  Neurologic: Grossly non-focal with full strength in all four extremities  Extremities: Normal range of motion, No clubbing, cyanosis or edema  Vascular: Peripheral pulses palpable 2+ bilaterally      INTERPRETATION OF TELEMETRY:    ECG:    LABS:                        13.0   9.2   )-----------( 174      ( 02 Dec 2018 07:04 )             37.8     12-02    133<L>  |  98  |  15.0  ----------------------------<  177<H>  4.0   |  24.0  |  0.68    Ca    8.7      02 Dec 2018 07:06  Phos  2.8     12-02  Mg     1.7     12-02    TPro  6.5<L>  /  Alb  4.2  /  TBili  0.6  /  DBili  x   /  AST  33  /  ALT  38  /  AlkPhos  57  12-02          PT/INR - ( 01 Dec 2018 16:08 )   PT: 11.7 sec;   INR: 1.02 ratio         PTT - ( 01 Dec 2018 16:08 )  PTT:28.2 sec  Urinalysis Basic - ( 01 Dec 2018 16:11 )    Color: Yellow / Appearance: Clear / S.015 / pH: x  Gluc: x / Ketone: Trace  / Bili: Negative / Urobili: Negative mg/dL   Blood: x / Protein: 30 mg/dL / Nitrite: Negative   Leuk Esterase: Negative / RBC: 0-2 /HPF / WBC 0-2   Sq Epi: x / Non Sq Epi: Occasional / Bacteria: Occasional      I&O's Summary    BNP  RADIOLOGY & ADDITIONAL STUDIES:

## 2018-12-03 ENCOUNTER — RESULT REVIEW (OUTPATIENT)
Age: 73
End: 2018-12-03

## 2018-12-03 ENCOUNTER — APPOINTMENT (OUTPATIENT)
Dept: COLORECTAL SURGERY | Facility: HOSPITAL | Age: 73
End: 2018-12-03
Payer: MEDICARE

## 2018-12-03 LAB
BLD GP AB SCN SERPL QL: SIGNIFICANT CHANGE UP
GLUCOSE BLDC GLUCOMTR-MCNC: 129 MG/DL — HIGH (ref 70–99)
GLUCOSE BLDC GLUCOMTR-MCNC: 132 MG/DL — HIGH (ref 70–99)
GLUCOSE BLDC GLUCOMTR-MCNC: 136 MG/DL — HIGH (ref 70–99)
GLUCOSE BLDC GLUCOMTR-MCNC: 158 MG/DL — HIGH (ref 70–99)
GLUCOSE BLDC GLUCOMTR-MCNC: 182 MG/DL — HIGH (ref 70–99)
TYPE + AB SCN PNL BLD: SIGNIFICANT CHANGE UP

## 2018-12-03 PROCEDURE — 99232 SBSQ HOSP IP/OBS MODERATE 35: CPT

## 2018-12-03 PROCEDURE — 88307 TISSUE EXAM BY PATHOLOGIST: CPT | Mod: 26

## 2018-12-03 PROCEDURE — 44204 LAPARO PARTIAL COLECTOMY: CPT

## 2018-12-03 PROCEDURE — ZZZZZ: CPT

## 2018-12-03 RX ORDER — SODIUM CHLORIDE 9 MG/ML
1000 INJECTION, SOLUTION INTRAVENOUS
Qty: 0 | Refills: 0 | Status: DISCONTINUED | OUTPATIENT
Start: 2018-12-03 | End: 2018-12-04

## 2018-12-03 RX ORDER — DEXTROSE 50 % IN WATER 50 %
15 SYRINGE (ML) INTRAVENOUS ONCE
Qty: 0 | Refills: 0 | Status: DISCONTINUED | OUTPATIENT
Start: 2018-12-03 | End: 2018-12-10

## 2018-12-03 RX ORDER — ATORVASTATIN CALCIUM 80 MG/1
40 TABLET, FILM COATED ORAL AT BEDTIME
Qty: 0 | Refills: 0 | Status: DISCONTINUED | OUTPATIENT
Start: 2018-12-03 | End: 2018-12-10

## 2018-12-03 RX ORDER — DEXTROSE 50 % IN WATER 50 %
25 SYRINGE (ML) INTRAVENOUS ONCE
Qty: 0 | Refills: 0 | Status: DISCONTINUED | OUTPATIENT
Start: 2018-12-03 | End: 2018-12-10

## 2018-12-03 RX ORDER — NALOXONE HYDROCHLORIDE 4 MG/.1ML
0.1 SPRAY NASAL
Qty: 0 | Refills: 0 | Status: DISCONTINUED | OUTPATIENT
Start: 2018-12-03 | End: 2018-12-10

## 2018-12-03 RX ORDER — ALVIMOPAN 12 MG/1
12 CAPSULE ORAL
Qty: 0 | Refills: 0 | Status: DISCONTINUED | OUTPATIENT
Start: 2018-12-03 | End: 2018-12-10

## 2018-12-03 RX ORDER — TAMSULOSIN HYDROCHLORIDE 0.4 MG/1
0.4 CAPSULE ORAL AT BEDTIME
Qty: 0 | Refills: 0 | Status: DISCONTINUED | OUTPATIENT
Start: 2018-12-03 | End: 2018-12-10

## 2018-12-03 RX ORDER — HEPARIN SODIUM 5000 [USP'U]/ML
5000 INJECTION INTRAVENOUS; SUBCUTANEOUS EVERY 8 HOURS
Qty: 0 | Refills: 0 | Status: DISCONTINUED | OUTPATIENT
Start: 2018-12-03 | End: 2018-12-10

## 2018-12-03 RX ORDER — INSULIN LISPRO 100/ML
VIAL (ML) SUBCUTANEOUS
Qty: 0 | Refills: 0 | Status: DISCONTINUED | OUTPATIENT
Start: 2018-12-03 | End: 2018-12-10

## 2018-12-03 RX ORDER — LISINOPRIL 2.5 MG/1
2.5 TABLET ORAL DAILY
Qty: 0 | Refills: 0 | Status: DISCONTINUED | OUTPATIENT
Start: 2018-12-03 | End: 2018-12-08

## 2018-12-03 RX ORDER — HYDROMORPHONE HYDROCHLORIDE 2 MG/ML
0.5 INJECTION INTRAMUSCULAR; INTRAVENOUS; SUBCUTANEOUS
Qty: 0 | Refills: 0 | Status: DISCONTINUED | OUTPATIENT
Start: 2018-12-03 | End: 2018-12-03

## 2018-12-03 RX ORDER — METOPROLOL TARTRATE 50 MG
5 TABLET ORAL EVERY 6 HOURS
Qty: 0 | Refills: 0 | Status: DISCONTINUED | OUTPATIENT
Start: 2018-12-03 | End: 2018-12-03

## 2018-12-03 RX ORDER — ONDANSETRON 8 MG/1
4 TABLET, FILM COATED ORAL ONCE
Qty: 0 | Refills: 0 | Status: DISCONTINUED | OUTPATIENT
Start: 2018-12-03 | End: 2018-12-03

## 2018-12-03 RX ORDER — DEXTROSE 50 % IN WATER 50 %
12.5 SYRINGE (ML) INTRAVENOUS ONCE
Qty: 0 | Refills: 0 | Status: DISCONTINUED | OUTPATIENT
Start: 2018-12-03 | End: 2018-12-10

## 2018-12-03 RX ORDER — ONDANSETRON 8 MG/1
4 TABLET, FILM COATED ORAL EVERY 6 HOURS
Qty: 0 | Refills: 0 | Status: DISCONTINUED | OUTPATIENT
Start: 2018-12-03 | End: 2018-12-10

## 2018-12-03 RX ORDER — NALOXONE HYDROCHLORIDE 4 MG/.1ML
0.1 SPRAY NASAL
Qty: 0 | Refills: 0 | Status: DISCONTINUED | OUTPATIENT
Start: 2018-12-03 | End: 2018-12-03

## 2018-12-03 RX ORDER — HYDROMORPHONE HYDROCHLORIDE 2 MG/ML
30 INJECTION INTRAMUSCULAR; INTRAVENOUS; SUBCUTANEOUS
Qty: 0 | Refills: 0 | Status: DISCONTINUED | OUTPATIENT
Start: 2018-12-03 | End: 2018-12-04

## 2018-12-03 RX ORDER — SODIUM CHLORIDE 9 MG/ML
1000 INJECTION, SOLUTION INTRAVENOUS
Qty: 0 | Refills: 0 | Status: DISCONTINUED | OUTPATIENT
Start: 2018-12-03 | End: 2018-12-03

## 2018-12-03 RX ORDER — GLUCAGON INJECTION, SOLUTION 0.5 MG/.1ML
1 INJECTION, SOLUTION SUBCUTANEOUS ONCE
Qty: 0 | Refills: 0 | Status: DISCONTINUED | OUTPATIENT
Start: 2018-12-03 | End: 2018-12-10

## 2018-12-03 RX ORDER — ACETAMINOPHEN 500 MG
650 TABLET ORAL EVERY 6 HOURS
Qty: 0 | Refills: 0 | Status: DISCONTINUED | OUTPATIENT
Start: 2018-12-03 | End: 2018-12-10

## 2018-12-03 RX ORDER — METOPROLOL TARTRATE 50 MG
25 TABLET ORAL
Qty: 0 | Refills: 0 | Status: DISCONTINUED | OUTPATIENT
Start: 2018-12-03 | End: 2018-12-10

## 2018-12-03 RX ADMIN — ATORVASTATIN CALCIUM 40 MILLIGRAM(S): 80 TABLET, FILM COATED ORAL at 21:16

## 2018-12-03 RX ADMIN — Medication 100 MILLIGRAM(S): at 05:43

## 2018-12-03 RX ADMIN — HEPARIN SODIUM 5000 UNIT(S): 5000 INJECTION INTRAVENOUS; SUBCUTANEOUS at 21:16

## 2018-12-03 RX ADMIN — LISINOPRIL 2.5 MILLIGRAM(S): 2.5 TABLET ORAL at 05:43

## 2018-12-03 RX ADMIN — MORPHINE SULFATE 2 MILLIGRAM(S): 50 CAPSULE, EXTENDED RELEASE ORAL at 09:50

## 2018-12-03 RX ADMIN — HYDROMORPHONE HYDROCHLORIDE 30 MILLILITER(S): 2 INJECTION INTRAMUSCULAR; INTRAVENOUS; SUBCUTANEOUS at 19:02

## 2018-12-03 RX ADMIN — MORPHINE SULFATE 2 MILLIGRAM(S): 50 CAPSULE, EXTENDED RELEASE ORAL at 10:05

## 2018-12-03 RX ADMIN — TAMSULOSIN HYDROCHLORIDE 0.4 MILLIGRAM(S): 0.4 CAPSULE ORAL at 21:16

## 2018-12-03 RX ADMIN — Medication 25 MILLIGRAM(S): at 05:43

## 2018-12-03 RX ADMIN — HYDROMORPHONE HYDROCHLORIDE 30 MILLILITER(S): 2 INJECTION INTRAMUSCULAR; INTRAVENOUS; SUBCUTANEOUS at 20:48

## 2018-12-03 NOTE — PROGRESS NOTE ADULT - ASSESSMENT
74 yo male with hld, htn, bph, who is admitted for intractable abdominal pain in setting of new abdominal mass concerning for neoplastic etiology    1. Abd pain sec to colon mass- Surg by CRS. pain meds. bowel regimen. CEA noted  - CT chest- non acute. tail of pancreas nodule  2. DM2, HLD, HTN- Cont insulin regimen. BP well controlled.  Cont rest of meds. ECHO noted.    ICDs    Pt has no clear contraindication to undergo planned procedure. He is hemodynamically stable with well controlled comorbidities.

## 2018-12-03 NOTE — PROGRESS NOTE ADULT - SUBJECTIVE AND OBJECTIVE BOX
HEALTH ISSUES - PROBLEM Dx:    CC- abd pain, colon mass,     INTERVAL HPI/ OVERNIGHT EVENTS:    no complaints  awaits surgery today      REVIEW OF SYSTEMS:    Abd pain + N/V -ve fever -ve    Vital Signs Last 24 Hrs  T(C): 36.9 (03 Dec 2018 05:10), Max: 37.3 (02 Dec 2018 20:36)  T(F): 98.4 (03 Dec 2018 05:10), Max: 99.1 (02 Dec 2018 20:36)  HR: 67 (03 Dec 2018 05:10) (67 - 78)  BP: 122/66 (03 Dec 2018 05:10) (122/66 - 142/76)  BP(mean): --  RR: 18 (03 Dec 2018 05:10) (18 - 18)  SpO2: 95% (03 Dec 2018 05:10) (93% - 97%)    PHYSICAL EXAM-  General: NAD, AOx3, comfortable on examination  HEENT: PERRLA, EOMI, moist mucous membranes  Neck: supple, nontender  Cardiovascular: heart RRR, no murmurs  Respiratory: no respiratory distress, CTAB  Abdomen: soft, mild RLQ tenderness, nondistended. No guarding or rebound. Normal bowel sounds.  Extremities: no peripheral edema. Normal ROM.  Integumentary: warm, no rash  Neuro: no motor or sensory deficits    LABS:                        13.0   9.2   )-----------( 174      ( 02 Dec 2018 07:04 )             37.8     12-02    133<L>  |  98  |  15.0  ----------------------------<  177<H>  4.0   |  24.0  |  0.68    Ca    8.7      02 Dec 2018 07:06  Phos  2.8     12-  Mg     1.7     12-02    TPro  6.5<L>  /  Alb  4.2  /  TBili  0.6  /  DBili  x   /  AST  33  /  ALT  38  /  AlkPhos  57  12-02    PT/INR - ( 01 Dec 2018 16:08 )   PT: 11.7 sec;   INR: 1.02 ratio         PTT - ( 01 Dec 2018 16:08 )  PTT:28.2 sec  Urinalysis Basic - ( 01 Dec 2018 16:11 )    Color: Yellow / Appearance: Clear / S.015 / pH: x  Gluc: x / Ketone: Trace  / Bili: Negative / Urobili: Negative mg/dL   Blood: x / Protein: 30 mg/dL / Nitrite: Negative   Leuk Esterase: Negative / RBC: 0-2 /HPF / WBC 0-2   Sq Epi: x / Non Sq Epi: Occasional / Bacteria: Occasional    Assessment and Plan

## 2018-12-03 NOTE — BRIEF OPERATIVE NOTE - PROCEDURE
<<-----Click on this checkbox to enter Procedure Ventral hernia repair  12/03/2018    Active  [id number]  Ileocolic resection  12/03/2018    Active  [id number]  Exploratory laparotomy  12/03/2018    Active  [id number]  Lysis of adhesions  12/03/2018    Active  [id number]

## 2018-12-04 DIAGNOSIS — L76.82 OTHER POSTPROCEDURAL COMPLICATIONS OF SKIN AND SUBCUTANEOUS TISSUE: ICD-10-CM

## 2018-12-04 DIAGNOSIS — R52 PAIN, UNSPECIFIED: ICD-10-CM

## 2018-12-04 DIAGNOSIS — G89.18 OTHER ACUTE POSTPROCEDURAL PAIN: ICD-10-CM

## 2018-12-04 LAB
ANION GAP SERPL CALC-SCNC: 16 MMOL/L — SIGNIFICANT CHANGE UP (ref 5–17)
BUN SERPL-MCNC: 17 MG/DL — SIGNIFICANT CHANGE UP (ref 8–20)
CALCIUM SERPL-MCNC: 8.4 MG/DL — LOW (ref 8.6–10.2)
CHLORIDE SERPL-SCNC: 104 MMOL/L — SIGNIFICANT CHANGE UP (ref 98–107)
CO2 SERPL-SCNC: 19 MMOL/L — LOW (ref 22–29)
CREAT SERPL-MCNC: 0.91 MG/DL — SIGNIFICANT CHANGE UP (ref 0.5–1.3)
EOSINOPHIL # BLD AUTO: 0 K/UL — SIGNIFICANT CHANGE UP (ref 0–0.5)
EOSINOPHIL NFR BLD AUTO: 0 % — SIGNIFICANT CHANGE UP (ref 0–5)
GLUCOSE BLDC GLUCOMTR-MCNC: 188 MG/DL — HIGH (ref 70–99)
GLUCOSE BLDC GLUCOMTR-MCNC: 232 MG/DL — HIGH (ref 70–99)
GLUCOSE BLDC GLUCOMTR-MCNC: 239 MG/DL — HIGH (ref 70–99)
GLUCOSE BLDC GLUCOMTR-MCNC: 248 MG/DL — HIGH (ref 70–99)
GLUCOSE SERPL-MCNC: 202 MG/DL — HIGH (ref 70–115)
HCT VFR BLD CALC: 36.3 % — LOW (ref 42–52)
HGB BLD-MCNC: 12.6 G/DL — LOW (ref 14–18)
LYMPHOCYTES # BLD AUTO: 1 K/UL — SIGNIFICANT CHANGE UP (ref 1–4.8)
LYMPHOCYTES # BLD AUTO: 9.4 % — LOW (ref 20–55)
MAGNESIUM SERPL-MCNC: 1.6 MG/DL — SIGNIFICANT CHANGE UP (ref 1.6–2.6)
MCHC RBC-ENTMCNC: 32.7 PG — HIGH (ref 27–31)
MCHC RBC-ENTMCNC: 34.7 G/DL — SIGNIFICANT CHANGE UP (ref 32–36)
MCV RBC AUTO: 94.3 FL — HIGH (ref 80–94)
MONOCYTES # BLD AUTO: 0.8 K/UL — SIGNIFICANT CHANGE UP (ref 0–0.8)
MONOCYTES NFR BLD AUTO: 7.4 % — SIGNIFICANT CHANGE UP (ref 3–10)
NEUTROPHILS # BLD AUTO: 8.9 K/UL — HIGH (ref 1.8–8)
NEUTROPHILS NFR BLD AUTO: 83 % — HIGH (ref 37–73)
PHOSPHATE SERPL-MCNC: 4.1 MG/DL — SIGNIFICANT CHANGE UP (ref 2.4–4.7)
PLATELET # BLD AUTO: 183 K/UL — SIGNIFICANT CHANGE UP (ref 150–400)
POTASSIUM SERPL-MCNC: 3.6 MMOL/L — SIGNIFICANT CHANGE UP (ref 3.5–5.3)
POTASSIUM SERPL-SCNC: 3.6 MMOL/L — SIGNIFICANT CHANGE UP (ref 3.5–5.3)
RBC # BLD: 3.85 M/UL — LOW (ref 4.6–6.2)
RBC # FLD: 12.7 % — SIGNIFICANT CHANGE UP (ref 11–15.6)
SODIUM SERPL-SCNC: 139 MMOL/L — SIGNIFICANT CHANGE UP (ref 135–145)
WBC # BLD: 10.7 K/UL — SIGNIFICANT CHANGE UP (ref 4.8–10.8)
WBC # FLD AUTO: 10.7 K/UL — SIGNIFICANT CHANGE UP (ref 4.8–10.8)

## 2018-12-04 PROCEDURE — 99233 SBSQ HOSP IP/OBS HIGH 50: CPT

## 2018-12-04 RX ORDER — HYDROMORPHONE HYDROCHLORIDE 2 MG/ML
0.5 INJECTION INTRAMUSCULAR; INTRAVENOUS; SUBCUTANEOUS EVERY 4 HOURS
Qty: 0 | Refills: 0 | Status: DISCONTINUED | OUTPATIENT
Start: 2018-12-04 | End: 2018-12-05

## 2018-12-04 RX ORDER — ACETAMINOPHEN 500 MG
1000 TABLET ORAL ONCE
Qty: 0 | Refills: 0 | Status: COMPLETED | OUTPATIENT
Start: 2018-12-04 | End: 2018-12-04

## 2018-12-04 RX ORDER — ACETAMINOPHEN 500 MG
1000 TABLET ORAL ONCE
Qty: 0 | Refills: 0 | Status: COMPLETED | OUTPATIENT
Start: 2018-12-05 | End: 2018-12-05

## 2018-12-04 RX ORDER — SODIUM CHLORIDE 9 MG/ML
500 INJECTION, SOLUTION INTRAVENOUS
Qty: 0 | Refills: 0 | Status: DISCONTINUED | OUTPATIENT
Start: 2018-12-04 | End: 2018-12-06

## 2018-12-04 RX ORDER — HYDROMORPHONE HYDROCHLORIDE 2 MG/ML
30 INJECTION INTRAMUSCULAR; INTRAVENOUS; SUBCUTANEOUS
Qty: 0 | Refills: 0 | Status: DISCONTINUED | OUTPATIENT
Start: 2018-12-04 | End: 2018-12-05

## 2018-12-04 RX ADMIN — HYDROMORPHONE HYDROCHLORIDE 30 MILLILITER(S): 2 INJECTION INTRAMUSCULAR; INTRAVENOUS; SUBCUTANEOUS at 19:21

## 2018-12-04 RX ADMIN — ALVIMOPAN 12 MILLIGRAM(S): 12 CAPSULE ORAL at 17:18

## 2018-12-04 RX ADMIN — ALVIMOPAN 12 MILLIGRAM(S): 12 CAPSULE ORAL at 05:51

## 2018-12-04 RX ADMIN — HEPARIN SODIUM 5000 UNIT(S): 5000 INJECTION INTRAVENOUS; SUBCUTANEOUS at 22:13

## 2018-12-04 RX ADMIN — HEPARIN SODIUM 5000 UNIT(S): 5000 INJECTION INTRAVENOUS; SUBCUTANEOUS at 05:53

## 2018-12-04 RX ADMIN — Medication 400 MILLIGRAM(S): at 17:19

## 2018-12-04 RX ADMIN — Medication 4: at 17:19

## 2018-12-04 RX ADMIN — Medication 25 MILLIGRAM(S): at 05:53

## 2018-12-04 RX ADMIN — Medication 1000 MILLIGRAM(S): at 22:28

## 2018-12-04 RX ADMIN — LISINOPRIL 2.5 MILLIGRAM(S): 2.5 TABLET ORAL at 05:51

## 2018-12-04 RX ADMIN — Medication 400 MILLIGRAM(S): at 22:13

## 2018-12-04 RX ADMIN — HYDROMORPHONE HYDROCHLORIDE 30 MILLILITER(S): 2 INJECTION INTRAMUSCULAR; INTRAVENOUS; SUBCUTANEOUS at 07:36

## 2018-12-04 RX ADMIN — Medication 1000 MILLIGRAM(S): at 17:50

## 2018-12-04 RX ADMIN — Medication 25 MILLIGRAM(S): at 17:18

## 2018-12-04 RX ADMIN — TAMSULOSIN HYDROCHLORIDE 0.4 MILLIGRAM(S): 0.4 CAPSULE ORAL at 22:13

## 2018-12-04 RX ADMIN — Medication 4: at 13:41

## 2018-12-04 RX ADMIN — HEPARIN SODIUM 5000 UNIT(S): 5000 INJECTION INTRAVENOUS; SUBCUTANEOUS at 13:42

## 2018-12-04 RX ADMIN — Medication 4: at 05:57

## 2018-12-04 RX ADMIN — ATORVASTATIN CALCIUM 40 MILLIGRAM(S): 80 TABLET, FILM COATED ORAL at 22:13

## 2018-12-04 NOTE — PROGRESS NOTE ADULT - SUBJECTIVE AND OBJECTIVE BOX
HPI:  Pt is a 74 yo male with a pmh/o HLD, DMII on metformin, BPH, HTN, GIB with hemmorhoids, who presents to ED c/o acute onset sharp abdominal pain that radiated from central abdomen to RLQ and began at 11pm. Pt states he has never had pain before or similar to other than as a child when his appendix was removed. Pt c/o minimal nausea this AM but otherwise has had no brbpr, hematemesis, palpitations, constipation, diarrhea, cp, HA. (01 Dec 2018 20:16)    He is now POD #1 ExLap, REAL, Ileocolic resection, Ventral hernia repair on IV PCA for pain management.      VERBAL REPORT: "When I get it, the medicine helps, but it's running out."  Patient reports midline abdominal incision pain. RN confirms that patient has been pressing frequently and hitting his 4 hour limit.  PAIN SCORE: 5/10   (VNRS)      Allergies  Gluten (Anaphylaxis)  No Known Drug Allergies      THERAPY:  [ ] IV PCA Morphine		[ ] 5 mg/mL	[ ] 1 mg/mL  [X] IV PCA Hydromorphone	[ ] 5 mg/mL	[X] 1 mg/mL  [ ] IV PCA Fentanyl		[ ] 50 micrograms/mL  Demand dose _0.2mg_ lockout _8_ (minutes) Continuous Rate _0_ Total: _7.4mg_  Daily      MEDICATIONS  (STANDING):  acetaminophen  IVPB .. 1000 milliGRAM(s) IV Intermittent once  acetaminophen  IVPB .. 1000 milliGRAM(s) IV Intermittent once  alvimopan 12 milliGRAM(s) Oral two times a day  atorvastatin 40 milliGRAM(s) Oral at bedtime  dextrose 50% Injectable 12.5 Gram(s) IV Push once  dextrose 50% Injectable 25 Gram(s) IV Push once  dextrose 50% Injectable 25 Gram(s) IV Push once  heparin  Injectable 5000 Unit(s) SubCutaneous every 8 hours  HYDROmorphone PCA (1 mG/mL) 30 milliLiter(s) PCA Continuous PCA Continuous  insulin lispro (HumaLOG) corrective regimen sliding scale   SubCutaneous three times a day before meals  lactated ringers. 500 milliLiter(s) (84 mL/Hr) IV Continuous <Continuous>  lisinopril 2.5 milliGRAM(s) Oral daily  metoprolol tartrate 25 milliGRAM(s) Oral two times a day  tamsulosin 0.4 milliGRAM(s) Oral at bedtime    MEDICATIONS  (PRN):  acetaminophen   Tablet .. 650 milliGRAM(s) Oral every 6 hours PRN Temp greater or equal to 38C (100.4F), Moderate Pain (4 - 6)  dextrose 40% Gel 15 Gram(s) Oral once PRN Blood Glucose LESS THAN 70 milliGRAM(s)/deciliter  glucagon  Injectable 1 milliGRAM(s) IntraMuscular once PRN Glucose LESS THAN 70 milligrams/deciliter  HYDROmorphone  Injectable 0.5 milliGRAM(s) IV Push every 4 hours PRN Severe Pain unrelieved by PCA  naloxone Injectable 0.1 milliGRAM(s) IV Push every 3 minutes PRN For ANY of the following changes in patient status:  A. RR LESS THAN 10 breaths per minute, B. Oxygen saturation LESS THAN 90%, C. Sedation score of 6  ondansetron Injectable 4 milliGRAM(s) IV Push every 6 hours PRN Nausea      OBJECTIVE:  Sedation Score:	[X] Alert	[ ] Drowsy	[ ] Arousable	[ ] Asleep	[ ] Unresponsive  Side Effects:	[X] None	[ ] Nausea	[ ] Vomiting	[ ] Pruritus	  [ ] Weakness		[ ] Numbness	[ ] Other:      PHYSICAL EXAM:  GENERAL: NAD, well-developed  HEAD:  Atraumatic, Normocephalic  EYES: EOMI, PERRLA, conjunctiva and sclera clear  NERVOUS SYSTEM:  Alert & Oriented X3, Good concentration; Motor Strength 5/5 B/L upper and lower extremities  CHEST/LUNG: Clear speech, no SOB  ABDOMEN: Incisional Tenderness; s/p NG Tube, NPO        LABS:                        12.6   10.7  )-----------( 183      ( 04 Dec 2018 07:25 )             36.3       12-04    139  |  104  |  17.0  ----------------------------<  202<H>  3.6   |  19.0<L>  |  0.91    Ca    8.4<L>      04 Dec 2018 07:25  Phos  4.1     12-04  Mg     1.6     12-04

## 2018-12-04 NOTE — PROGRESS NOTE ADULT - ASSESSMENT
74 yo male with hld, htn, bph, who is admitted for intractable abdominal pain in setting of new abdominal mass concerning for neoplastic etiology    1. Abd pain sec to colon mass- now s/p ex-lap, R ileocolonic resection, and ventral hernia repair. pain meds per pain mngmt. NGT removal and ice chips allowed per CRS today. no BMs yet.     2. DM2, HLD, HTN- Cont insulin regimen. BP well controlled.  Cont rest of meds. ECHO noted.    ICDs 74 yo male with hld, htn, bph, who is admitted for intractable abdominal pain in setting of new abdominal mass concerning for neoplastic etiology    1. Abd pain sec to colon mass- now s/p ex-lap, R ileocolonic resection, and ventral hernia repair. pain meds per pain mngmt. NGT removal and ice chips allowed per CRS today. no BMs yet.     2. HLD, HTN- BP well controlled.  Cont rest of meds. ECHO noted.    3. DM2- uncontrolled accuchecks. but since post op and no PO intake yet will remain on HISS.    ICDs

## 2018-12-04 NOTE — PROGRESS NOTE ADULT - SUBJECTIVE AND OBJECTIVE BOX
73 year old Male, S/P Right Hemicolectomy under GETA on 12/3/18. Patient is resting comfortably, vital signs are stable. Patient is in no distress. Patient had no complaints or complications with regard to Anesthesia Care.

## 2018-12-04 NOTE — PROGRESS NOTE ADULT - PROBLEM SELECTOR PLAN 1
1. Setting Modified  - Reduce lockout to 6mins  - Increase demand to 0.25mg  - Increase 4 hour Limit to 5mg

## 2018-12-04 NOTE — PROGRESS NOTE ADULT - ASSESSMENT
73 year old male found to have R colon mass on CT now s/p ex lap,  R ileocolonic resection, and ventral hernia repair recovering well post-operatively. Hemodynamically stable.     - serial abdominal exams  - monitor bowel function  - follow up pathology   - will likely d/c whitney and NGT 12/4 AM  - Pain control with dPCA  - Entereg  - O2 on 2L NC  - IVF @125cc  - encourage ambulation  - DVT ppx with SCDs and lovenox

## 2018-12-04 NOTE — PROGRESS NOTE ADULT - ASSESSMENT
72 yo male, now POD #1 Ex Lap, REAL, Ileocolic resection, ventral hernia repair. Found A&Ox3, NAD, sitting up in chair. Pain appreciated. No signs of oversedation or intense pain. Discussed modifying PCA settings to maximize analgesia.     PLAN    Therapy to  be:	[X] Continue   [ ] Discontinued   [ ] Change to prn Analgesics    Documentation and Verification of current medications:  [X] Done	[ ] Not done, not eligible  [ ] Not done, reason not given    [X]  [facility] Reviewed. Reference # [id number]

## 2018-12-04 NOTE — PROGRESS NOTE ADULT - SUBJECTIVE AND OBJECTIVE BOX
HEALTH ISSUES - PROBLEM Dx:    now s/p ex-lap, R ileocolonic resection, and ventral hernia repair for intestinal mass    INTERVAL HPI/ OVERNIGHT EVENTS:    c/o pain; says PCA not working; asking to eat ice chips and drink sips of water    REVIEW OF SYSTEMS:    fever -ve, pain +ve Bm -ve flatus -ve    Vital Signs Last 24 Hrs  T(C): 36.8 (04 Dec 2018 08:24), Max: 38.1 (04 Dec 2018 04:35)  T(F): 98.3 (04 Dec 2018 08:24), Max: 100.5 (04 Dec 2018 04:35)  HR: 74 (04 Dec 2018 08:24) (64 - 96)  BP: 107/56 (04 Dec 2018 08:24) (107/56 - 167/77)  BP(mean): --  RR: 18 (04 Dec 2018 08:24) (15 - 18)  SpO2: 98% (04 Dec 2018 08:24) (94% - 98%)    PHYSICAL EXAM-  General: NAD, AOx3, comfortable on examination  HEENT: PERRLA, EOMI, moist mucous membranes  Neck: supple, nontender  Cardiovascular: heart RRR, no murmurs  Respiratory: no respiratory distress, CTAB  Abdomen: soft, surgical site tenderness nondistended. No BS. dressing C/D/I   Extremities: no peripheral edema. Normal ROM.  Integumentary: warm, no rash  Neuro: no motor or sensory deficits    LABS:                        12.6   10.7  )-----------( 183      ( 04 Dec 2018 07:25 )             36.3     12-04    139  |  104  |  17.0  ----------------------------<  202<H>  3.6   |  19.0<L>  |  0.91    Ca    8.4<L>      04 Dec 2018 07:25  Phos  4.1     12-04  Mg     1.6     12-04    Assessment and Plan

## 2018-12-04 NOTE — PROGRESS NOTE ADULT - SUBJECTIVE AND OBJECTIVE BOX
Progress/Post-Op note:    Patient now s/p ex-lap, R ileocolonic resection, and ventral hernia repair. VSS, afebrile. Resting comfortably in bed. Pain well controlled on dPCA. Whitney in place, 150 cc UOP. NGT in place, minimal output.  Denies nausea, vomiting, fever, chills. No BM, no flatus.     MEDICATIONS  (STANDING):  alvimopan 12 milliGRAM(s) Oral two times a day  atorvastatin 40 milliGRAM(s) Oral at bedtime  dextrose 50% Injectable 12.5 Gram(s) IV Push once  dextrose 50% Injectable 25 Gram(s) IV Push once  dextrose 50% Injectable 25 Gram(s) IV Push once  heparin  Injectable 5000 Unit(s) SubCutaneous every 8 hours  HYDROmorphone PCA (1 mG/mL) 30 milliLiter(s) PCA Continuous PCA Continuous  insulin lispro (HumaLOG) corrective regimen sliding scale   SubCutaneous three times a day before meals  lactated ringers. 1000 milliLiter(s) (125 mL/Hr) IV Continuous <Continuous>  lisinopril 2.5 milliGRAM(s) Oral daily  metoprolol tartrate 25 milliGRAM(s) Oral two times a day  tamsulosin 0.4 milliGRAM(s) Oral at bedtime    MEDICATIONS  (PRN):  acetaminophen   Tablet .. 650 milliGRAM(s) Oral every 6 hours PRN Temp greater or equal to 38C (100.4F), Moderate Pain (4 - 6)  dextrose 40% Gel 15 Gram(s) Oral once PRN Blood Glucose LESS THAN 70 milliGRAM(s)/deciliter  glucagon  Injectable 1 milliGRAM(s) IntraMuscular once PRN Glucose LESS THAN 70 milligrams/deciliter  naloxone Injectable 0.1 milliGRAM(s) IV Push every 3 minutes PRN For ANY of the following changes in patient status:  A. RR LESS THAN 10 breaths per minute, B. Oxygen saturation LESS THAN 90%, C. Sedation score of 6  ondansetron Injectable 4 milliGRAM(s) IV Push every 6 hours PRN Nausea      Vital Signs Last 24 Hrs  T(C): 36.7 (03 Dec 2018 20:49), Max: 37.9 (03 Dec 2018 18:45)  T(F): 98.1 (03 Dec 2018 20:49), Max: 100.3 (03 Dec 2018 18:45)  HR: 88 (03 Dec 2018 20:49) (59 - 91)  BP: 146/77 (03 Dec 2018 20:49) (111/61 - 167/77)  BP(mean): --  RR: 16 (03 Dec 2018 20:49) (15 - 18)  SpO2: 97% (03 Dec 2018 20:49) (94% - 98%)    Physical Exam:   Gen: no acute distress  Pulm: CTAB  CV: RRR, normal S1 S2  Abd: soft, nontender, mildly distended. no rebound, no guarding. AIMEE dressing midline in place.   : whitney  Ext: no edema, cyanosis  Vasc: 2+ peripheral   Neuro: alert and oriented x 2      I&O's Detail    03 Dec 2018 07:01  -  04 Dec 2018 00:06  --------------------------------------------------------  IN:    lactated ringers.: 250 mL  Total IN: 250 mL    OUT:    Indwelling Catheter - Urethral: 500 mL  Total OUT: 500 mL    Total NET: -250 mL          LABS:                        13.0   9.2   )-----------( 174      ( 02 Dec 2018 07:04 )             37.8     12-02    133<L>  |  98  |  15.0  ----------------------------<  177<H>  4.0   |  24.0  |  0.68    Ca    8.7      02 Dec 2018 07:06  Phos  2.8     12-02  Mg     1.7     12-02    TPro  6.5<L>  /  Alb  4.2  /  TBili  0.6  /  DBili  x   /  AST  33  /  ALT  38  /  AlkPhos  57  12-02          RADIOLOGY & ADDITIONAL STUDIES: Progress/Post-Op note:    Patient now s/p ex-lap, R ileocolonic resection, and ventral hernia repair. VSS, afebrile. Resting comfortably in bed. Pain well controlled on dPCA. Whitney in place, 500cc UOP. NGT in place, minimal output.  Denies nausea, vomiting, fever, chills. No BM, no flatus.     MEDICATIONS  (STANDING):  alvimopan 12 milliGRAM(s) Oral two times a day  atorvastatin 40 milliGRAM(s) Oral at bedtime  dextrose 50% Injectable 12.5 Gram(s) IV Push once  dextrose 50% Injectable 25 Gram(s) IV Push once  dextrose 50% Injectable 25 Gram(s) IV Push once  heparin  Injectable 5000 Unit(s) SubCutaneous every 8 hours  HYDROmorphone PCA (1 mG/mL) 30 milliLiter(s) PCA Continuous PCA Continuous  insulin lispro (HumaLOG) corrective regimen sliding scale   SubCutaneous three times a day before meals  lactated ringers. 1000 milliLiter(s) (125 mL/Hr) IV Continuous <Continuous>  lisinopril 2.5 milliGRAM(s) Oral daily  metoprolol tartrate 25 milliGRAM(s) Oral two times a day  tamsulosin 0.4 milliGRAM(s) Oral at bedtime    MEDICATIONS  (PRN):  acetaminophen   Tablet .. 650 milliGRAM(s) Oral every 6 hours PRN Temp greater or equal to 38C (100.4F), Moderate Pain (4 - 6)  dextrose 40% Gel 15 Gram(s) Oral once PRN Blood Glucose LESS THAN 70 milliGRAM(s)/deciliter  glucagon  Injectable 1 milliGRAM(s) IntraMuscular once PRN Glucose LESS THAN 70 milligrams/deciliter  naloxone Injectable 0.1 milliGRAM(s) IV Push every 3 minutes PRN For ANY of the following changes in patient status:  A. RR LESS THAN 10 breaths per minute, B. Oxygen saturation LESS THAN 90%, C. Sedation score of 6  ondansetron Injectable 4 milliGRAM(s) IV Push every 6 hours PRN Nausea      Vital Signs Last 24 Hrs  T(C): 36.7 (03 Dec 2018 20:49), Max: 37.9 (03 Dec 2018 18:45)  T(F): 98.1 (03 Dec 2018 20:49), Max: 100.3 (03 Dec 2018 18:45)  HR: 88 (03 Dec 2018 20:49) (59 - 91)  BP: 146/77 (03 Dec 2018 20:49) (111/61 - 167/77)  BP(mean): --  RR: 16 (03 Dec 2018 20:49) (15 - 18)  SpO2: 97% (03 Dec 2018 20:49) (94% - 98%)    Physical Exam:   Gen: no acute distress  Pulm: CTAB  CV: RRR, normal S1 S2  Abd: soft, nontender, mildly distended. no rebound, no guarding. AIMEE dressing midline in place.   : whitney  Ext: no edema, cyanosis  Vasc: 2+ peripheral   Neuro: alert and oriented x 2      I&O's Detail    03 Dec 2018 07:01  -  04 Dec 2018 00:06  --------------------------------------------------------  IN:    lactated ringers.: 250 mL  Total IN: 250 mL    OUT:    Indwelling Catheter - Urethral: 500 mL  Total OUT: 500 mL    Total NET: -250 mL          LABS:                        13.0   9.2   )-----------( 174      ( 02 Dec 2018 07:04 )             37.8     12-02    133<L>  |  98  |  15.0  ----------------------------<  177<H>  4.0   |  24.0  |  0.68    Ca    8.7      02 Dec 2018 07:06  Phos  2.8     12-02  Mg     1.7     12-02    TPro  6.5<L>  /  Alb  4.2  /  TBili  0.6  /  DBili  x   /  AST  33  /  ALT  38  /  AlkPhos  57  12-02          RADIOLOGY & ADDITIONAL STUDIES:

## 2018-12-05 LAB
GLUCOSE BLDC GLUCOMTR-MCNC: 169 MG/DL — HIGH (ref 70–99)
GLUCOSE BLDC GLUCOMTR-MCNC: 171 MG/DL — HIGH (ref 70–99)
GLUCOSE BLDC GLUCOMTR-MCNC: 176 MG/DL — HIGH (ref 70–99)
GLUCOSE BLDC GLUCOMTR-MCNC: 190 MG/DL — HIGH (ref 70–99)
GLUCOSE BLDC GLUCOMTR-MCNC: 216 MG/DL — HIGH (ref 70–99)

## 2018-12-05 PROCEDURE — 99232 SBSQ HOSP IP/OBS MODERATE 35: CPT

## 2018-12-05 RX ORDER — HYDROMORPHONE HYDROCHLORIDE 2 MG/ML
0.5 INJECTION INTRAMUSCULAR; INTRAVENOUS; SUBCUTANEOUS EVERY 6 HOURS
Qty: 0 | Refills: 0 | Status: DISCONTINUED | OUTPATIENT
Start: 2018-12-05 | End: 2018-12-06

## 2018-12-05 RX ORDER — ACETAMINOPHEN 500 MG
650 TABLET ORAL EVERY 6 HOURS
Qty: 0 | Refills: 0 | Status: DISCONTINUED | OUTPATIENT
Start: 2018-12-05 | End: 2018-12-06

## 2018-12-05 RX ORDER — ACETAMINOPHEN 500 MG
1000 TABLET ORAL ONCE
Qty: 0 | Refills: 0 | Status: COMPLETED | OUTPATIENT
Start: 2018-12-05 | End: 2018-12-05

## 2018-12-05 RX ORDER — ONDANSETRON 8 MG/1
4 TABLET, FILM COATED ORAL EVERY 6 HOURS
Qty: 0 | Refills: 0 | Status: DISCONTINUED | OUTPATIENT
Start: 2018-12-05 | End: 2018-12-10

## 2018-12-05 RX ORDER — OXYCODONE HYDROCHLORIDE 5 MG/1
5 TABLET ORAL EVERY 4 HOURS
Qty: 0 | Refills: 0 | Status: DISCONTINUED | OUTPATIENT
Start: 2018-12-05 | End: 2018-12-10

## 2018-12-05 RX ORDER — OXYCODONE HYDROCHLORIDE 5 MG/1
10 TABLET ORAL EVERY 4 HOURS
Qty: 0 | Refills: 0 | Status: DISCONTINUED | OUTPATIENT
Start: 2018-12-05 | End: 2018-12-05

## 2018-12-05 RX ORDER — OXYCODONE HYDROCHLORIDE 5 MG/1
10 TABLET ORAL EVERY 4 HOURS
Qty: 0 | Refills: 0 | Status: DISCONTINUED | OUTPATIENT
Start: 2018-12-05 | End: 2018-12-10

## 2018-12-05 RX ADMIN — Medication 650 MILLIGRAM(S): at 18:01

## 2018-12-05 RX ADMIN — OXYCODONE HYDROCHLORIDE 10 MILLIGRAM(S): 5 TABLET ORAL at 18:01

## 2018-12-05 RX ADMIN — Medication 650 MILLIGRAM(S): at 18:58

## 2018-12-05 RX ADMIN — Medication 25 MILLIGRAM(S): at 05:57

## 2018-12-05 RX ADMIN — HEPARIN SODIUM 5000 UNIT(S): 5000 INJECTION INTRAVENOUS; SUBCUTANEOUS at 13:10

## 2018-12-05 RX ADMIN — Medication 650 MILLIGRAM(S): at 23:11

## 2018-12-05 RX ADMIN — OXYCODONE HYDROCHLORIDE 10 MILLIGRAM(S): 5 TABLET ORAL at 18:58

## 2018-12-05 RX ADMIN — Medication 2: at 05:56

## 2018-12-05 RX ADMIN — Medication 25 MILLIGRAM(S): at 18:00

## 2018-12-05 RX ADMIN — Medication 400 MILLIGRAM(S): at 05:54

## 2018-12-05 RX ADMIN — Medication 4: at 12:51

## 2018-12-05 RX ADMIN — Medication 1000 MILLIGRAM(S): at 06:11

## 2018-12-05 RX ADMIN — Medication 400 MILLIGRAM(S): at 12:43

## 2018-12-05 RX ADMIN — Medication 1000 MILLIGRAM(S): at 12:43

## 2018-12-05 RX ADMIN — HEPARIN SODIUM 5000 UNIT(S): 5000 INJECTION INTRAVENOUS; SUBCUTANEOUS at 23:11

## 2018-12-05 RX ADMIN — Medication 2: at 18:05

## 2018-12-05 RX ADMIN — ATORVASTATIN CALCIUM 40 MILLIGRAM(S): 80 TABLET, FILM COATED ORAL at 23:11

## 2018-12-05 RX ADMIN — SODIUM CHLORIDE 84 MILLILITER(S): 9 INJECTION, SOLUTION INTRAVENOUS at 05:58

## 2018-12-05 RX ADMIN — OXYCODONE HYDROCHLORIDE 10 MILLIGRAM(S): 5 TABLET ORAL at 13:45

## 2018-12-05 RX ADMIN — TAMSULOSIN HYDROCHLORIDE 0.4 MILLIGRAM(S): 0.4 CAPSULE ORAL at 23:11

## 2018-12-05 RX ADMIN — LISINOPRIL 2.5 MILLIGRAM(S): 2.5 TABLET ORAL at 05:57

## 2018-12-05 RX ADMIN — ALVIMOPAN 12 MILLIGRAM(S): 12 CAPSULE ORAL at 18:00

## 2018-12-05 RX ADMIN — OXYCODONE HYDROCHLORIDE 10 MILLIGRAM(S): 5 TABLET ORAL at 12:51

## 2018-12-05 RX ADMIN — ALVIMOPAN 12 MILLIGRAM(S): 12 CAPSULE ORAL at 05:56

## 2018-12-05 RX ADMIN — HEPARIN SODIUM 5000 UNIT(S): 5000 INJECTION INTRAVENOUS; SUBCUTANEOUS at 05:56

## 2018-12-05 NOTE — PROGRESS NOTE ADULT - ASSESSMENT
73 year old male found to have R colon mass on CT now s/p ex lap,  R ileocolonic resection, and ventral hernia repair recovering well post-operatively. Hemodynamically stable.     - adv diet to CLD today  - serial abdominal exams  - monitor bowel function  - follow up pathology   - Pain control with dPCA; will convert to PO regimen today  - Entereg  - O2 on 2L NC  - IVF @125cc  - encourage ambulation  - DVT ppx with SCDs and lovenox 73 year old male found to have R colon mass on CT now s/p ex lap,  R ileocolonic resection, and ventral hernia repair recovering well post-operatively. Hemodynamically stable.     - adv diet to CLD today  - serial abdominal exams  - monitor bowel function  - follow up pathology   - Pain control with dPCA; will convert to PO regimen today  - Entereg  - O2 on 2L NC  - IVF@84  - encourage ambulation  - DVT ppx with SCDs and lovenox

## 2018-12-05 NOTE — PROGRESS NOTE ADULT - ASSESSMENT
74 yo male, now POD #2 Ex Lap, REAL, Ileocolic resection, ventral hernia repair. Found A&Ox3, NAD, sitting up in chair. Pain appreciated. No signs of oversedation. Discussed transition to oral analgesics.

## 2018-12-05 NOTE — PROGRESS NOTE ADULT - SUBJECTIVE AND OBJECTIVE BOX
Patient is a 73y old  Male who presents with a chief complaint of abdominal pain (05 Dec 2018 06:57). He is now POD #1 ExLap, REAL, Ileocolic resection, Ventral hernia repair on IV PCA for pain management.      VERBAL REPORT: "This morning I had some liquids and my stomach blew up."    PAIN SCORE:                   SCALE USED: VNRS    Allergies  Gluten (Anaphylaxis)  No Known Drug Allergies      PAST MEDICAL & SURGICAL HISTORY:  Hyperlipidemia, unspecified hyperlipidemia type  Hypertension, unspecified type  Type 2 diabetes mellitus without complication, with long-term current use of insulin  Other acute appendicitis      MEDICATIONS  (STANDING):  acetaminophen   Tablet .. 650 milliGRAM(s) Oral every 6 hours  acetaminophen  IVPB .. 1000 milliGRAM(s) IV Intermittent once  alvimopan 12 milliGRAM(s) Oral two times a day  atorvastatin 40 milliGRAM(s) Oral at bedtime  dextrose 50% Injectable 12.5 Gram(s) IV Push once  dextrose 50% Injectable 25 Gram(s) IV Push once  dextrose 50% Injectable 25 Gram(s) IV Push once  heparin  Injectable 5000 Unit(s) SubCutaneous every 8 hours  insulin lispro (HumaLOG) corrective regimen sliding scale   SubCutaneous three times a day before meals  lactated ringers. 500 milliLiter(s) (84 mL/Hr) IV Continuous <Continuous>  lisinopril 2.5 milliGRAM(s) Oral daily  metoprolol tartrate 25 milliGRAM(s) Oral two times a day  oxyCODONE    IR 10 milliGRAM(s) Oral every 4 hours  tamsulosin 0.4 milliGRAM(s) Oral at bedtime    MEDICATIONS  (PRN):  acetaminophen   Tablet .. 650 milliGRAM(s) Oral every 6 hours PRN Temp greater or equal to 38C (100.4F), Moderate Pain (4 - 6)  dextrose 40% Gel 15 Gram(s) Oral once PRN Blood Glucose LESS THAN 70 milliGRAM(s)/deciliter  glucagon  Injectable 1 milliGRAM(s) IntraMuscular once PRN Glucose LESS THAN 70 milligrams/deciliter  HYDROmorphone  Injectable 0.5 milliGRAM(s) IV Push every 6 hours PRN Severe Pain persisting 1 hour after Oxycodone IR  naloxone Injectable 0.1 milliGRAM(s) IV Push every 3 minutes PRN For ANY of the following changes in patient status:  A. RR LESS THAN 10 breaths per minute, B. Oxygen saturation LESS THAN 90%, C. Sedation score of 6  ondansetron Injectable 4 milliGRAM(s) IV Push every 6 hours PRN Nausea and/or Vomiting  ondansetron Injectable 4 milliGRAM(s) IV Push every 6 hours PRN Nausea  oxyCODONE    IR 5 milliGRAM(s) Oral every 4 hours PRN Moderate Pain (4 - 6)      PHYSICAL EXAM:  GENERAL: NAD, well-developed  HEAD:  Atraumatic, Normocephalic  EYES: EOMI, PERRLA, conjunctiva and sclera clear  NERVOUS SYSTEM:  Alert & Oriented X3, Good concentration; Motor Strength 5/5 B/L upper and lower extremities  CHEST/LUNG: Clear speech, no SOB  ABDOMEN: Incisional Tenderness, Distention; Tolerates clears      Vital Signs Last 24 Hrs  T(C): 36.1 (05 Dec 2018 08:10), Max: 36.9 (04 Dec 2018 16:23)  T(F): 96.9 (05 Dec 2018 08:10), Max: 98.5 (04 Dec 2018 16:23)  HR: 67 (05 Dec 2018 08:10) (67 - 82)  BP: 120/71 (05 Dec 2018 08:10) (120/71 - 153/66)  BP(mean): --  RR: 19 (05 Dec 2018 08:10) (18 - 20)  SpO2: 96% (05 Dec 2018 08:10) (95% - 98%)                          12.6   10.7  )-----------( 183      ( 04 Dec 2018 07:25 )             36.3

## 2018-12-05 NOTE — PROGRESS NOTE ADULT - ASSESSMENT
74 yo male with hld, htn, bph, who is admitted for intractable abdominal pain in setting of new abdominal mass concerning for neoplastic etiology    1. Abd pain sec to colon mass- now s/p ex-lap, R ileocolonic resection, and ventral hernia repair. pain meds per pain mngmt. Clears started today    2. HLD, HTN- BP well controlled.  Cont rest of meds. ECHO noted.    3. DM2- uncontrolled accuchecks. but since post op and no PO intake yet will remain on HISS.    ICDs

## 2018-12-05 NOTE — PROGRESS NOTE ADULT - SUBJECTIVE AND OBJECTIVE BOX
HEALTH ISSUES - PROBLEM Dx:    CC- now s/p ex-lap, R ileocolonic resection, and ventral hernia repair for intestinal mass      INTERVAL HPI/ OVERNIGHT EVENTS:    this AM CLD was started and after coffee and soup he started c/o abd pain with nausea.  instructed to avoid acidic fruit juices and caffeinated drinks and stick to somers and mild etc    REVIEW OF SYSTEMS:    incisional pain +ve; N +ve; V -ve, BM +ve.     Vital Signs Last 24 Hrs  T(C): 36.8 (05 Dec 2018 15:56), Max: 36.9 (04 Dec 2018 16:23)  T(F): 98.2 (05 Dec 2018 15:56), Max: 98.5 (04 Dec 2018 16:23)  HR: 71 (05 Dec 2018 15:56) (67 - 82)  BP: 141/69 (05 Dec 2018 15:56) (120/71 - 153/66)  BP(mean): --  RR: 20 (05 Dec 2018 15:56) (18 - 20)  SpO2: 95% (05 Dec 2018 15:56) (95% - 98%)    PHYSICAL EXAM-  General: NAD, AOx3, comfortable on examination  HEENT: PERRLA, EOMI, moist mucous membranes  Neck: supple, nontender  Cardiovascular: heart RRR, no murmurs  Respiratory: no respiratory distress, CTAB  Abdomen: soft, surgical site tenderness nondistended. No BS. dressing C/D/I   Extremities: no peripheral edema. Normal ROM.  Integumentary: warm, no rash  Neuro: no motor or sensory deficits    LABS:                        12.6   10.7  )-----------( 183      ( 04 Dec 2018 07:25 )             36.3     12-04    139  |  104  |  17.0  ----------------------------<  202<H>  3.6   |  19.0<L>  |  0.91    Ca    8.4<L>      04 Dec 2018 07:25  Phos  4.1     12-04  Mg     1.6     12-04    Assessment and Plan

## 2018-12-05 NOTE — PROGRESS NOTE ADULT - SUBJECTIVE AND OBJECTIVE BOX
Patient now s/p ex-lap, R ileocolonic resection, and ventral hernia repair. VSS, afebrile. Resting comfortably in bed. Pain well controlled on dPCA.  Denies nausea, vomiting, fever, chills. Reports little gas and small bowel movement yesterday.     MEDICATIONS  (STANDING):  alvimopan 12 milliGRAM(s) Oral two times a day  atorvastatin 40 milliGRAM(s) Oral at bedtime  dextrose 50% Injectable 12.5 Gram(s) IV Push once  dextrose 50% Injectable 25 Gram(s) IV Push once  dextrose 50% Injectable 25 Gram(s) IV Push once  heparin  Injectable 5000 Unit(s) SubCutaneous every 8 hours  HYDROmorphone PCA (1 mG/mL) 30 milliLiter(s) PCA Continuous PCA Continuous  insulin lispro (HumaLOG) corrective regimen sliding scale   SubCutaneous three times a day before meals  lactated ringers. 500 milliLiter(s) (84 mL/Hr) IV Continuous <Continuous>  lisinopril 2.5 milliGRAM(s) Oral daily  metoprolol tartrate 25 milliGRAM(s) Oral two times a day  tamsulosin 0.4 milliGRAM(s) Oral at bedtime    MEDICATIONS  (PRN):  acetaminophen   Tablet .. 650 milliGRAM(s) Oral every 6 hours PRN Temp greater or equal to 38C (100.4F), Moderate Pain (4 - 6)  dextrose 40% Gel 15 Gram(s) Oral once PRN Blood Glucose LESS THAN 70 milliGRAM(s)/deciliter  glucagon  Injectable 1 milliGRAM(s) IntraMuscular once PRN Glucose LESS THAN 70 milligrams/deciliter  HYDROmorphone  Injectable 0.5 milliGRAM(s) IV Push every 4 hours PRN Severe Pain unrelieved by PCA  naloxone Injectable 0.1 milliGRAM(s) IV Push every 3 minutes PRN For ANY of the following changes in patient status:  A. RR LESS THAN 10 breaths per minute, B. Oxygen saturation LESS THAN 90%, C. Sedation score of 6  ondansetron Injectable 4 milliGRAM(s) IV Push every 6 hours PRN Nausea      Vital Signs Last 24 Hrs  T(C): 36.9 (05 Dec 2018 05:05), Max: 36.9 (04 Dec 2018 16:23)  T(F): 98.5 (05 Dec 2018 05:05), Max: 98.5 (04 Dec 2018 16:23)  HR: 78 (05 Dec 2018 05:05) (72 - 82)  BP: 153/66 (05 Dec 2018 05:05) (107/56 - 153/66)  BP(mean): --  RR: 20 (05 Dec 2018 05:05) (18 - 20)  SpO2: 95% (05 Dec 2018 05:05) (95% - 98%)    Physical Exam:   Gen: no acute distress  Pulm: CTAB  CV: RRR, normal S1 S2  Abd: soft, nontender, mildly distended. no rebound, no guarding. AIMEE dressing midline in place.   Ext: no edema, cyanosis  Vasc: 2+ peripheral   Neuro: alert and oriented x 2    I&O's Detail    03 Dec 2018 07:01  -  04 Dec 2018 07:00  --------------------------------------------------------  IN:    lactated ringers.: 875 mL  Total IN: 875 mL    OUT:    Indwelling Catheter - Urethral: 1200 mL    Nasoenteral Tube: 70 mL  Total OUT: 1270 mL    Total NET: -395 mL      04 Dec 2018 07:01  -  05 Dec 2018 06:58  --------------------------------------------------------  IN:    lactated ringers.: 1512 mL    lactated ringers.: 375 mL    Solution: 200 mL  Total IN: 2087 mL    OUT:    Voided: 800 mL  Total OUT: 800 mL    Total NET: 1287 mL          LABS:                        12.6   10.7  )-----------( 183      ( 04 Dec 2018 07:25 )             36.3     12-04    139  |  104  |  17.0  ----------------------------<  202<H>  3.6   |  19.0<L>  |  0.91    Ca    8.4<L>      04 Dec 2018 07:25  Phos  4.1     12-04  Mg     1.6     12-04            RADIOLOGY & ADDITIONAL STUDIES:

## 2018-12-06 DIAGNOSIS — R14.1 GAS PAIN: ICD-10-CM

## 2018-12-06 LAB
ANION GAP SERPL CALC-SCNC: 10 MMOL/L — SIGNIFICANT CHANGE UP (ref 5–17)
BUN SERPL-MCNC: 17 MG/DL — SIGNIFICANT CHANGE UP (ref 8–20)
CALCIUM SERPL-MCNC: 8.5 MG/DL — LOW (ref 8.6–10.2)
CHLORIDE SERPL-SCNC: 103 MMOL/L — SIGNIFICANT CHANGE UP (ref 98–107)
CO2 SERPL-SCNC: 26 MMOL/L — SIGNIFICANT CHANGE UP (ref 22–29)
CREAT SERPL-MCNC: 0.73 MG/DL — SIGNIFICANT CHANGE UP (ref 0.5–1.3)
GLUCOSE BLDC GLUCOMTR-MCNC: 158 MG/DL — HIGH (ref 70–99)
GLUCOSE BLDC GLUCOMTR-MCNC: 171 MG/DL — HIGH (ref 70–99)
GLUCOSE SERPL-MCNC: 175 MG/DL — HIGH (ref 70–115)
HCT VFR BLD CALC: 31 % — LOW (ref 42–52)
HGB BLD-MCNC: 10.7 G/DL — LOW (ref 14–18)
MAGNESIUM SERPL-MCNC: 1.8 MG/DL — SIGNIFICANT CHANGE UP (ref 1.8–2.6)
MCHC RBC-ENTMCNC: 32.4 PG — HIGH (ref 27–31)
MCHC RBC-ENTMCNC: 34.5 G/DL — SIGNIFICANT CHANGE UP (ref 32–36)
MCV RBC AUTO: 93.9 FL — SIGNIFICANT CHANGE UP (ref 80–94)
PHOSPHATE SERPL-MCNC: 2.2 MG/DL — LOW (ref 2.4–4.7)
PLATELET # BLD AUTO: 174 K/UL — SIGNIFICANT CHANGE UP (ref 150–400)
POTASSIUM SERPL-MCNC: 3.5 MMOL/L — SIGNIFICANT CHANGE UP (ref 3.5–5.3)
POTASSIUM SERPL-SCNC: 3.5 MMOL/L — SIGNIFICANT CHANGE UP (ref 3.5–5.3)
RBC # BLD: 3.3 M/UL — LOW (ref 4.6–6.2)
RBC # FLD: 12.3 % — SIGNIFICANT CHANGE UP (ref 11–15.6)
SODIUM SERPL-SCNC: 139 MMOL/L — SIGNIFICANT CHANGE UP (ref 135–145)
TROPONIN T SERPL-MCNC: <0.01 NG/ML — SIGNIFICANT CHANGE UP (ref 0–0.06)
WBC # BLD: 7.5 K/UL — SIGNIFICANT CHANGE UP (ref 4.8–10.8)
WBC # FLD AUTO: 7.5 K/UL — SIGNIFICANT CHANGE UP (ref 4.8–10.8)

## 2018-12-06 PROCEDURE — 74018 RADEX ABDOMEN 1 VIEW: CPT | Mod: 26

## 2018-12-06 PROCEDURE — 99233 SBSQ HOSP IP/OBS HIGH 50: CPT

## 2018-12-06 PROCEDURE — 93010 ELECTROCARDIOGRAM REPORT: CPT

## 2018-12-06 RX ORDER — ACETAMINOPHEN 500 MG
1000 TABLET ORAL ONCE
Qty: 0 | Refills: 0 | Status: COMPLETED | OUTPATIENT
Start: 2018-12-07 | End: 2018-12-07

## 2018-12-06 RX ORDER — DOCUSATE SODIUM 100 MG
100 CAPSULE ORAL
Qty: 0 | Refills: 0 | Status: DISCONTINUED | OUTPATIENT
Start: 2018-12-06 | End: 2018-12-10

## 2018-12-06 RX ORDER — ACETAMINOPHEN 500 MG
650 TABLET ORAL EVERY 6 HOURS
Qty: 0 | Refills: 0 | Status: COMPLETED | OUTPATIENT
Start: 2018-12-08 | End: 2018-12-09

## 2018-12-06 RX ORDER — HYDROMORPHONE HYDROCHLORIDE 2 MG/ML
0.5 INJECTION INTRAMUSCULAR; INTRAVENOUS; SUBCUTANEOUS EVERY 6 HOURS
Qty: 0 | Refills: 0 | Status: DISCONTINUED | OUTPATIENT
Start: 2018-12-06 | End: 2018-12-06

## 2018-12-06 RX ORDER — SODIUM CHLORIDE 9 MG/ML
1000 INJECTION, SOLUTION INTRAVENOUS
Qty: 0 | Refills: 0 | Status: DISCONTINUED | OUTPATIENT
Start: 2018-12-06 | End: 2018-12-07

## 2018-12-06 RX ORDER — ACETAMINOPHEN 500 MG
1000 TABLET ORAL ONCE
Qty: 0 | Refills: 0 | Status: COMPLETED | OUTPATIENT
Start: 2018-12-06 | End: 2018-12-06

## 2018-12-06 RX ORDER — SIMETHICONE 80 MG/1
80 TABLET, CHEWABLE ORAL ONCE
Qty: 0 | Refills: 0 | Status: COMPLETED | OUTPATIENT
Start: 2018-12-06 | End: 2018-12-06

## 2018-12-06 RX ORDER — POTASSIUM PHOSPHATE, MONOBASIC POTASSIUM PHOSPHATE, DIBASIC 236; 224 MG/ML; MG/ML
30 INJECTION, SOLUTION INTRAVENOUS ONCE
Qty: 0 | Refills: 0 | Status: COMPLETED | OUTPATIENT
Start: 2018-12-06 | End: 2018-12-06

## 2018-12-06 RX ADMIN — Medication 650 MILLIGRAM(S): at 05:47

## 2018-12-06 RX ADMIN — Medication 1000 MILLIGRAM(S): at 13:29

## 2018-12-06 RX ADMIN — Medication 400 MILLIGRAM(S): at 13:14

## 2018-12-06 RX ADMIN — HYDROMORPHONE HYDROCHLORIDE 0.5 MILLIGRAM(S): 2 INJECTION INTRAMUSCULAR; INTRAVENOUS; SUBCUTANEOUS at 13:13

## 2018-12-06 RX ADMIN — ALVIMOPAN 12 MILLIGRAM(S): 12 CAPSULE ORAL at 05:47

## 2018-12-06 RX ADMIN — Medication 1000 MILLIGRAM(S): at 02:29

## 2018-12-06 RX ADMIN — ALVIMOPAN 12 MILLIGRAM(S): 12 CAPSULE ORAL at 17:22

## 2018-12-06 RX ADMIN — Medication 25 MILLIGRAM(S): at 17:23

## 2018-12-06 RX ADMIN — Medication 100 MILLIGRAM(S): at 17:22

## 2018-12-06 RX ADMIN — Medication 650 MILLIGRAM(S): at 06:29

## 2018-12-06 RX ADMIN — Medication 1000 MILLIGRAM(S): at 22:06

## 2018-12-06 RX ADMIN — Medication 650 MILLIGRAM(S): at 00:00

## 2018-12-06 RX ADMIN — Medication 25 MILLIGRAM(S): at 05:47

## 2018-12-06 RX ADMIN — OXYCODONE HYDROCHLORIDE 10 MILLIGRAM(S): 5 TABLET ORAL at 01:40

## 2018-12-06 RX ADMIN — TAMSULOSIN HYDROCHLORIDE 0.4 MILLIGRAM(S): 0.4 CAPSULE ORAL at 21:50

## 2018-12-06 RX ADMIN — ATORVASTATIN CALCIUM 40 MILLIGRAM(S): 80 TABLET, FILM COATED ORAL at 21:50

## 2018-12-06 RX ADMIN — Medication 400 MILLIGRAM(S): at 01:59

## 2018-12-06 RX ADMIN — POTASSIUM PHOSPHATE, MONOBASIC POTASSIUM PHOSPHATE, DIBASIC 85 MILLIMOLE(S): 236; 224 INJECTION, SOLUTION INTRAVENOUS at 18:51

## 2018-12-06 RX ADMIN — Medication 2: at 09:09

## 2018-12-06 RX ADMIN — HEPARIN SODIUM 5000 UNIT(S): 5000 INJECTION INTRAVENOUS; SUBCUTANEOUS at 13:17

## 2018-12-06 RX ADMIN — SIMETHICONE 80 MILLIGRAM(S): 80 TABLET, CHEWABLE ORAL at 02:10

## 2018-12-06 RX ADMIN — HYDROMORPHONE HYDROCHLORIDE 0.5 MILLIGRAM(S): 2 INJECTION INTRAMUSCULAR; INTRAVENOUS; SUBCUTANEOUS at 13:29

## 2018-12-06 RX ADMIN — SODIUM CHLORIDE 125 MILLILITER(S): 9 INJECTION, SOLUTION INTRAVENOUS at 18:52

## 2018-12-06 RX ADMIN — ONDANSETRON 4 MILLIGRAM(S): 8 TABLET, FILM COATED ORAL at 01:59

## 2018-12-06 RX ADMIN — Medication 100 MILLIGRAM(S): at 05:47

## 2018-12-06 RX ADMIN — HEPARIN SODIUM 5000 UNIT(S): 5000 INJECTION INTRAVENOUS; SUBCUTANEOUS at 05:47

## 2018-12-06 RX ADMIN — Medication 400 MILLIGRAM(S): at 21:49

## 2018-12-06 RX ADMIN — HEPARIN SODIUM 5000 UNIT(S): 5000 INJECTION INTRAVENOUS; SUBCUTANEOUS at 21:50

## 2018-12-06 RX ADMIN — Medication 2: at 17:22

## 2018-12-06 RX ADMIN — LISINOPRIL 2.5 MILLIGRAM(S): 2.5 TABLET ORAL at 05:47

## 2018-12-06 RX ADMIN — OXYCODONE HYDROCHLORIDE 10 MILLIGRAM(S): 5 TABLET ORAL at 01:08

## 2018-12-06 NOTE — PROGRESS NOTE ADULT - PROBLEM SELECTOR PLAN 2
1. IV Dilaudid PRN severe pain  - Resume Oxycodone IR once no longer NPO  Pain service  [phone]  Text page via [employer].

## 2018-12-06 NOTE — PROGRESS NOTE ADULT - SUBJECTIVE AND OBJECTIVE BOX
Patient now s/p ex-lap, R ileocolonic resection, and ventral hernia repair.  Over night, patient reported severe abdominal pain with increased distension. Repeat KUB shows diffuse small bowel dilation concerning for SBO vs ileus. Patient denies fever, chills, nausea, vomiting on CLD.     MEDICATIONS  (STANDING):  acetaminophen   Tablet .. 650 milliGRAM(s) Oral every 6 hours  alvimopan 12 milliGRAM(s) Oral two times a day  atorvastatin 40 milliGRAM(s) Oral at bedtime  dextrose 50% Injectable 12.5 Gram(s) IV Push once  dextrose 50% Injectable 25 Gram(s) IV Push once  dextrose 50% Injectable 25 Gram(s) IV Push once  docusate sodium 100 milliGRAM(s) Oral two times a day  heparin  Injectable 5000 Unit(s) SubCutaneous every 8 hours  insulin lispro (HumaLOG) corrective regimen sliding scale   SubCutaneous three times a day before meals  lactated ringers. 500 milliLiter(s) (84 mL/Hr) IV Continuous <Continuous>  lisinopril 2.5 milliGRAM(s) Oral daily  metoprolol tartrate 25 milliGRAM(s) Oral two times a day  tamsulosin 0.4 milliGRAM(s) Oral at bedtime    MEDICATIONS  (PRN):  acetaminophen   Tablet .. 650 milliGRAM(s) Oral every 6 hours PRN Temp greater or equal to 38C (100.4F), Moderate Pain (4 - 6)  dextrose 40% Gel 15 Gram(s) Oral once PRN Blood Glucose LESS THAN 70 milliGRAM(s)/deciliter  glucagon  Injectable 1 milliGRAM(s) IntraMuscular once PRN Glucose LESS THAN 70 milligrams/deciliter  HYDROmorphone  Injectable 0.5 milliGRAM(s) IV Push every 6 hours PRN Severe Pain persisting 1 hour after Oxycodone IR  naloxone Injectable 0.1 milliGRAM(s) IV Push every 3 minutes PRN For ANY of the following changes in patient status:  A. RR LESS THAN 10 breaths per minute, B. Oxygen saturation LESS THAN 90%, C. Sedation score of 6  ondansetron Injectable 4 milliGRAM(s) IV Push every 6 hours PRN Nausea and/or Vomiting  ondansetron Injectable 4 milliGRAM(s) IV Push every 6 hours PRN Nausea  oxyCODONE    IR 5 milliGRAM(s) Oral every 4 hours PRN Moderate Pain (4 - 6)  oxyCODONE    IR 10 milliGRAM(s) Oral every 4 hours PRN Severe Pain (7 - 10)      Vital Signs Last 24 Hrs  T(C): 36.7 (06 Dec 2018 08:26), Max: 38.2 (05 Dec 2018 23:40)  T(F): 98.1 (06 Dec 2018 08:26), Max: 100.7 (05 Dec 2018 23:40)  HR: 67 (06 Dec 2018 08:26) (67 - 80)  BP: 154/77 (06 Dec 2018 08:26) (141/69 - 163/75)  BP(mean): --  RR: 18 (06 Dec 2018 08:26) (18 - 20)  SpO2: 96% (06 Dec 2018 08:26) (95% - 98%)      Physical Exam:   Gen: no acute distress  Pulm: CTAB  CV: RRR, normal S1 S2  Abd: soft, nontended, moderately distended, tympanitic,  no rebound, no guarding. AIMEE dressing midline in place.   Ext: no edema, cyanosis  Vasc: 2+ peripheral   Neuro: alert and oriented x 2    I&O's Detail    05 Dec 2018 07:01  -  06 Dec 2018 07:00  --------------------------------------------------------  IN:    lactated ringers.: 672 mL  Total IN: 672 mL    OUT:  Total OUT: 0 mL    Total NET: 672 mL          LABS:                        10.7   7.5   )-----------( 174      ( 06 Dec 2018 05:17 )             31.0     12-06    139  |  103  |  17.0  ----------------------------<  175<H>  3.5   |  26.0  |  0.73    Ca    8.5<L>      06 Dec 2018 05:17  Phos  2.2     12-06  Mg     1.8     12-06            RADIOLOGY & ADDITIONAL STUDIES:

## 2018-12-06 NOTE — PROGRESS NOTE ADULT - ASSESSMENT
73 year old male found to have R colon mass on CT now s/p ex lap,  R ileocolonic resection, and ventral hernia repair with episode of severe abdominal pain associated with increased distension concerning for SBO vs ileus      - NPO, IVF @ 125  - Monitor for N/V; Low threshold to place NGT for decompression  - serial abdominal exams  - monitor bowel function  - follow up pathology   - Pain control with PO pain medication regimen  - Entereg  - O2 on 2L NC  - encourage ambulation  - DVT ppx with SCDs and lovenox

## 2018-12-06 NOTE — PROGRESS NOTE ADULT - SUBJECTIVE AND OBJECTIVE BOX
Patient is a 73y old  Male who presents with a chief complaint of abdominal pain (06 Dec 2018 09:12). He is now POD #3 ExLap, REAL, Ileocolic resection, Ventral hernia repair. PCA changed to oral analgesics yesterday.      VERBAL REPORT: "I'm about the same."  Patient continues to have abdominal gas pain. He admits to ambulating.  PAIN SCORE:  5/10           SCALE USED: VNRS    Allergies  Gluten (Anaphylaxis)  No Known Drug Allergies      PAST MEDICAL & SURGICAL HISTORY:  Hyperlipidemia, unspecified hyperlipidemia type  Hypertension, unspecified type  Type 2 diabetes mellitus without complication, with long-term current use of insulin  Other acute appendicitis      MEDICATIONS  (STANDING):  acetaminophen  IVPB .. 1000 milliGRAM(s) IV Intermittent once  acetaminophen  IVPB .. 1000 milliGRAM(s) IV Intermittent once  alvimopan 12 milliGRAM(s) Oral two times a day  atorvastatin 40 milliGRAM(s) Oral at bedtime  dextrose 50% Injectable 12.5 Gram(s) IV Push once  dextrose 50% Injectable 25 Gram(s) IV Push once  dextrose 50% Injectable 25 Gram(s) IV Push once  docusate sodium 100 milliGRAM(s) Oral two times a day  heparin  Injectable 5000 Unit(s) SubCutaneous every 8 hours  insulin lispro (HumaLOG) corrective regimen sliding scale   SubCutaneous three times a day before meals  lactated ringers. 1000 milliLiter(s) (125 mL/Hr) IV Continuous <Continuous>  lisinopril 2.5 milliGRAM(s) Oral daily  metoprolol tartrate 25 milliGRAM(s) Oral two times a day  potassium phosphate IVPB 30 milliMole(s) IV Intermittent once  tamsulosin 0.4 milliGRAM(s) Oral at bedtime    MEDICATIONS  (PRN):  acetaminophen   Tablet .. 650 milliGRAM(s) Oral every 6 hours PRN Temp greater or equal to 38C (100.4F), Moderate Pain (4 - 6)  dextrose 40% Gel 15 Gram(s) Oral once PRN Blood Glucose LESS THAN 70 milliGRAM(s)/deciliter  glucagon  Injectable 1 milliGRAM(s) IntraMuscular once PRN Glucose LESS THAN 70 milligrams/deciliter  HYDROmorphone  Injectable 0.5 milliGRAM(s) IV Push every 6 hours PRN Moderate and Severe Pain (4 - 10)  naloxone Injectable 0.1 milliGRAM(s) IV Push every 3 minutes PRN For ANY of the following changes in patient status:  A. RR LESS THAN 10 breaths per minute, B. Oxygen saturation LESS THAN 90%, C. Sedation score of 6  ondansetron Injectable 4 milliGRAM(s) IV Push every 6 hours PRN Nausea and/or Vomiting  ondansetron Injectable 4 milliGRAM(s) IV Push every 6 hours PRN Nausea  oxyCODONE    IR 5 milliGRAM(s) Oral every 4 hours PRN Moderate Pain (4 - 6)  oxyCODONE    IR 10 milliGRAM(s) Oral every 4 hours PRN Severe Pain (7 - 10)      PHYSICAL EXAM:  GENERAL: NAD, well-developed  HEAD:  Atraumatic, Normocephalic  EYES: EOMI, PERRLA, conjunctiva and sclera clear  NERVOUS SYSTEM:  Alert & Oriented X3, Good concentration; Motor Strength 5/5 B/L upper and lower extremities  CHEST/LUNG: Clear speech, no SOB  ABDOMEN: Incisional Tenderness, Distention; Now NPO      Vital Signs Last 24 Hrs  T(C): 36.7 (06 Dec 2018 08:26), Max: 38.2 (05 Dec 2018 23:40)  T(F): 98.1 (06 Dec 2018 08:26), Max: 100.7 (05 Dec 2018 23:40)  HR: 67 (06 Dec 2018 08:26) (67 - 80)  BP: 154/77 (06 Dec 2018 08:26) (141/69 - 163/75)  BP(mean): --  RR: 18 (06 Dec 2018 08:26) (18 - 20)  SpO2: 96% (06 Dec 2018 08:26) (95% - 98%)                          10.7   7.5   )-----------( 174      ( 06 Dec 2018 05:17 )             31.0

## 2018-12-06 NOTE — PROVIDER CONTACT NOTE (OTHER) - ACTION/TREATMENT ORDERED:
continue to monitor. IVF will be increased to 125mL/hr. possible NG tube placement. MD requesting supplies to be set up bedside.

## 2018-12-06 NOTE — PROGRESS NOTE ADULT - ASSESSMENT
74 y/o male, now POD #3 Ex Lap, REAL, Ileocolic resection, ventral hernia repair. Found A&Ox3, NAD, supine in bed. Abdominal tympany on exam. No signs of oversedation. Discussed use of IV Tylenol for pain until no longer NPO.

## 2018-12-06 NOTE — PROGRESS NOTE ADULT - ASSESSMENT
72 yo male with hld, htn, bph, who is admitted for intractable abdominal pain in setting of new abdominal mass concerning for neoplastic etiology    1. Abd pain sec to colon mass- now s/p ex-lap, R ileocolonic resection, and ventral hernia repair. pain meds per pain mngmt. made NPO again for mild ileus on KUB and symptomatic.     2. HLD, HTN- BP well controlled.  Cont rest of meds. ECHO noted.    3. DM2- uncontrolled accuchecks. but since post op and no PO intake yet will remain on HISS.    4. hypophosphatemia- replete    ICDs

## 2018-12-06 NOTE — PROVIDER CONTACT NOTE (OTHER) - SITUATION
start of shift pt bowel sounds hypoactive. current assessment. bowel sounds very hard to hear. pt has no n+v.

## 2018-12-06 NOTE — PROGRESS NOTE ADULT - SUBJECTIVE AND OBJECTIVE BOX
HEALTH ISSUES - PROBLEM Dx:    CC- now s/p ex-lap, R ileocolonic resection, and ventral hernia repair for intestinal mass    INTERVAL HPI/ OVERNIGHT EVENTS:    over night after clear liq,   abd distention and pain  made NPo again  no emesis.   REVIEW OF SYSTEMS:    N +ve, abd pain +ve, distention +ve, v -ve     Vital Signs Last 24 Hrs  T(C): 36.7 (06 Dec 2018 08:26), Max: 38.2 (05 Dec 2018 23:40)  T(F): 98.1 (06 Dec 2018 08:26), Max: 100.7 (05 Dec 2018 23:40)  HR: 67 (06 Dec 2018 08:26) (67 - 80)  BP: 154/77 (06 Dec 2018 08:26) (141/69 - 163/75)  BP(mean): --  RR: 18 (06 Dec 2018 08:26) (18 - 20)  SpO2: 96% (06 Dec 2018 08:26) (95% - 98%)    PHYSICAL EXAM-  General: NAD, AOx3, comfortable on examination  HEENT: PERRLA, EOMI, moist mucous membranes  Neck: supple, nontender  Cardiovascular: heart RRR, no murmurs  Respiratory: no respiratory distress, CTAB  Abdomen: soft, surgical site tenderness +ve distended. BS +ve. dressing C/D/I   Extremities: no peripheral edema. Normal ROM.  Integumentary: warm, no rash  Neuro: no motor or sensory deficits    LABS:                        10.7   7.5   )-----------( 174      ( 06 Dec 2018 05:17 )             31.0     12-06    139  |  103  |  17.0  ----------------------------<  175<H>  3.5   |  26.0  |  0.73    Ca    8.5<L>      06 Dec 2018 05:17  Phos  2.2     12-06  Mg     1.8     12-06    Assessment and Plan

## 2018-12-06 NOTE — PROGRESS NOTE ADULT - ATTENDING COMMENTS
Patient seen and examined. No bowel function yet. On exam , distended, appropriately tender. Made NPO earlier this am. Encourage ambulation.

## 2018-12-06 NOTE — CHART NOTE - NSCHARTNOTEFT_GEN_A_CORE
Patient c/o of 8/10 diffuse abdominal pain, states he feels more bloated then this morning.  Patient told nursing staff that the pain awake him from sleep.  Pt states he has not passed gas since earlier that day.  Patient has not had a bm post operatively.  Today he was advanced to a clear liquid diet and states he tried a little juice but did not eat much.  Denies N/V, CP, sob, dyspnea, or f/c.      /82 HR 78 SPO2 98% RA T: 99.9  Con: Sitting upright at edge of bed, in good spirits, NAD  Card: RRR, S1 S2  Pulm: CTAB, nonlabored breathing  Abd: softly distended, ttp over gloria dressing, tympanic +hyperactive bowel sounds, dressing c/d/i  Ext: 2+ pulses  Skin: gloria dressing    ECG and KUB ordered  A/P: 73M s/p ex lap w/ r ileocolonic resection, now with abdominal pain likely secondary to an ileus.      -IV tylenol for pain control  -Zofran   -Simethicone x1   -Optimize positioning  -Encouraged ambulating in the AM    Patient also reassessed after medications given, states he had improvement pain 6/10    Will continue to reassess in the AM Patient c/o of 8/10 diffuse abdominal pain, states he feels more bloated then this morning.  Patient told nursing staff that the pain awake him from sleep.  Pt states he has not passed gas since earlier that day.  Patient has not had a bm post operatively.  Today he was advanced to a clear liquid diet and states he tried a little juice but did not eat much.  Denies N/V, CP, sob, dyspnea, or f/c.      /82 HR 78 SPO2 98% RA T: 99.9  Con: Sitting upright at edge of bed, in good spirits, NAD  Card: RRR, S1 S2  Pulm: CTAB, nonlabored breathing  Abd: softly distended, ttp over gloria dressing, tympanic +hyperactive bowel sounds, dressing c/d/i  Ext: 2+ pulses  Skin: gloria dressing    ECG and KUB ordered  A/P: 73M s/p ex lap w/ r ileocolonic resection, now with abdominal pain likely secondary to an ileus.      -ECG and KUB ordered- nonspecific bowel gas pattern, no air under diaphragm   -IV tylenol for pain control  -Zofran   -Simethicone x1   -Optimize positioning  -Encouraged ambulating in the AM    Patient also reassessed after medications given, states he had improvement pain 6/10    Will continue to reassess in the AM Patient c/o of 8/10 diffuse abdominal pain, states he feels more bloated then this morning.  Patient told nursing staff that the pain awake him from sleep.  Pt states he has not passed gas since earlier that day.  Patient has not had a bm post operatively.  Today he was advanced to a clear liquid diet and states he tried a little juice but did not eat much.  Denies N/V, CP, sob, dyspnea, or f/c.      /82 HR 78 SPO2 98% RA T: 99.9  Con: Sitting upright at edge of bed, in good spirits, NAD  Card: RRR, S1 S2  Pulm: CTAB, nonlabored breathing  Abd: softly distended, ttp over gloria dressing, tympanic +hyperactive bowel sounds, dressing c/d/i  Ext: 2+ pulses  Skin: gloria dressing    ECG and KUB ordered  A/P: 73M s/p ex lap w/ r ileocolonic resection, now with abdominal pain likely secondary to an ileus.      -ECG and KUB ordered- nonspecific bowel gas pattern, no air under diaphragm, no dilated bowel   -IV tylenol for pain control  -Zofran   -Simethicone x1   -Optimize positioning  -Encouraged ambulating in the AM    Patient also reassessed after medications given, states he had improvement pain 6/10    Will continue to reassess in the AM

## 2018-12-07 LAB
ANION GAP SERPL CALC-SCNC: 13 MMOL/L — SIGNIFICANT CHANGE UP (ref 5–17)
BUN SERPL-MCNC: 12 MG/DL — SIGNIFICANT CHANGE UP (ref 8–20)
CALCIUM SERPL-MCNC: 8.6 MG/DL — SIGNIFICANT CHANGE UP (ref 8.6–10.2)
CHLORIDE SERPL-SCNC: 105 MMOL/L — SIGNIFICANT CHANGE UP (ref 98–107)
CO2 SERPL-SCNC: 24 MMOL/L — SIGNIFICANT CHANGE UP (ref 22–29)
CREAT SERPL-MCNC: 0.71 MG/DL — SIGNIFICANT CHANGE UP (ref 0.5–1.3)
GLUCOSE BLDC GLUCOMTR-MCNC: 131 MG/DL — HIGH (ref 70–99)
GLUCOSE BLDC GLUCOMTR-MCNC: 138 MG/DL — HIGH (ref 70–99)
GLUCOSE BLDC GLUCOMTR-MCNC: 147 MG/DL — HIGH (ref 70–99)
GLUCOSE BLDC GLUCOMTR-MCNC: 187 MG/DL — HIGH (ref 70–99)
GLUCOSE BLDC GLUCOMTR-MCNC: 201 MG/DL — HIGH (ref 70–99)
GLUCOSE SERPL-MCNC: 149 MG/DL — HIGH (ref 70–115)
HCT VFR BLD CALC: 30.5 % — LOW (ref 42–52)
HGB BLD-MCNC: 10.5 G/DL — LOW (ref 14–18)
MAGNESIUM SERPL-MCNC: 1.7 MG/DL — SIGNIFICANT CHANGE UP (ref 1.6–2.6)
MCHC RBC-ENTMCNC: 32.1 PG — HIGH (ref 27–31)
MCHC RBC-ENTMCNC: 34.4 G/DL — SIGNIFICANT CHANGE UP (ref 32–36)
MCV RBC AUTO: 93.3 FL — SIGNIFICANT CHANGE UP (ref 80–94)
PHOSPHATE SERPL-MCNC: 2.8 MG/DL — SIGNIFICANT CHANGE UP (ref 2.4–4.7)
PLATELET # BLD AUTO: 216 K/UL — SIGNIFICANT CHANGE UP (ref 150–400)
POTASSIUM SERPL-MCNC: 3.4 MMOL/L — LOW (ref 3.5–5.3)
POTASSIUM SERPL-SCNC: 3.4 MMOL/L — LOW (ref 3.5–5.3)
RBC # BLD: 3.27 M/UL — LOW (ref 4.6–6.2)
RBC # FLD: 11.8 % — SIGNIFICANT CHANGE UP (ref 11–15.6)
SODIUM SERPL-SCNC: 142 MMOL/L — SIGNIFICANT CHANGE UP (ref 135–145)
SURGICAL PATHOLOGY FINAL REPORT - CH: SIGNIFICANT CHANGE UP
WBC # BLD: 7.4 K/UL — SIGNIFICANT CHANGE UP (ref 4.8–10.8)
WBC # FLD AUTO: 7.4 K/UL — SIGNIFICANT CHANGE UP (ref 4.8–10.8)

## 2018-12-07 PROCEDURE — 99232 SBSQ HOSP IP/OBS MODERATE 35: CPT

## 2018-12-07 RX ORDER — POTASSIUM CHLORIDE 20 MEQ
10 PACKET (EA) ORAL
Qty: 0 | Refills: 0 | Status: COMPLETED | OUTPATIENT
Start: 2018-12-07 | End: 2018-12-07

## 2018-12-07 RX ADMIN — Medication 25 MILLIGRAM(S): at 05:40

## 2018-12-07 RX ADMIN — SODIUM CHLORIDE 125 MILLILITER(S): 9 INJECTION, SOLUTION INTRAVENOUS at 00:28

## 2018-12-07 RX ADMIN — Medication 100 MILLIEQUIVALENT(S): at 10:21

## 2018-12-07 RX ADMIN — Medication 400 MILLIGRAM(S): at 14:01

## 2018-12-07 RX ADMIN — Medication 2: at 11:38

## 2018-12-07 RX ADMIN — Medication 1000 MILLIGRAM(S): at 14:30

## 2018-12-07 RX ADMIN — Medication 4: at 17:32

## 2018-12-07 RX ADMIN — Medication 100 MILLIGRAM(S): at 05:40

## 2018-12-07 RX ADMIN — Medication 1000 MILLIGRAM(S): at 05:56

## 2018-12-07 RX ADMIN — ALVIMOPAN 12 MILLIGRAM(S): 12 CAPSULE ORAL at 17:32

## 2018-12-07 RX ADMIN — OXYCODONE HYDROCHLORIDE 10 MILLIGRAM(S): 5 TABLET ORAL at 05:42

## 2018-12-07 RX ADMIN — Medication 100 MILLIEQUIVALENT(S): at 11:17

## 2018-12-07 RX ADMIN — TAMSULOSIN HYDROCHLORIDE 0.4 MILLIGRAM(S): 0.4 CAPSULE ORAL at 22:12

## 2018-12-07 RX ADMIN — OXYCODONE HYDROCHLORIDE 10 MILLIGRAM(S): 5 TABLET ORAL at 17:36

## 2018-12-07 RX ADMIN — OXYCODONE HYDROCHLORIDE 10 MILLIGRAM(S): 5 TABLET ORAL at 11:40

## 2018-12-07 RX ADMIN — HEPARIN SODIUM 5000 UNIT(S): 5000 INJECTION INTRAVENOUS; SUBCUTANEOUS at 14:02

## 2018-12-07 RX ADMIN — Medication 100 MILLIGRAM(S): at 17:32

## 2018-12-07 RX ADMIN — OXYCODONE HYDROCHLORIDE 10 MILLIGRAM(S): 5 TABLET ORAL at 10:47

## 2018-12-07 RX ADMIN — Medication 400 MILLIGRAM(S): at 05:41

## 2018-12-07 RX ADMIN — ATORVASTATIN CALCIUM 40 MILLIGRAM(S): 80 TABLET, FILM COATED ORAL at 22:12

## 2018-12-07 RX ADMIN — Medication 25 MILLIGRAM(S): at 17:32

## 2018-12-07 RX ADMIN — OXYCODONE HYDROCHLORIDE 10 MILLIGRAM(S): 5 TABLET ORAL at 18:30

## 2018-12-07 RX ADMIN — OXYCODONE HYDROCHLORIDE 10 MILLIGRAM(S): 5 TABLET ORAL at 06:39

## 2018-12-07 RX ADMIN — Medication 100 MILLIEQUIVALENT(S): at 09:17

## 2018-12-07 RX ADMIN — HEPARIN SODIUM 5000 UNIT(S): 5000 INJECTION INTRAVENOUS; SUBCUTANEOUS at 05:40

## 2018-12-07 RX ADMIN — LISINOPRIL 2.5 MILLIGRAM(S): 2.5 TABLET ORAL at 05:40

## 2018-12-07 RX ADMIN — ALVIMOPAN 12 MILLIGRAM(S): 12 CAPSULE ORAL at 05:40

## 2018-12-07 RX ADMIN — HEPARIN SODIUM 5000 UNIT(S): 5000 INJECTION INTRAVENOUS; SUBCUTANEOUS at 22:12

## 2018-12-07 NOTE — DIETITIAN INITIAL EVALUATION ADULT. - PERTINENT LABORATORY DATA
12-07 Na142 mmol/L Glu 149 mg/dL<H> K+ 3.4 mmol/L<L> Cr  0.71 mg/dL BUN 12.0 mg/dL Phos 2.8 mg/dL Alb n/a   PAB n/a

## 2018-12-07 NOTE — PROGRESS NOTE ADULT - PROBLEM SELECTOR PLAN 2
1. May resume Oxycodone IR PRN  Pain service signs off  [phone]  Text page via [employer].

## 2018-12-07 NOTE — CHART NOTE - NSCHARTNOTEFT_GEN_A_CORE
Upon Nutritional Assessment by the Registered Dietitian your patient was determined to meet criteria / has evidence of the following diagnosis/diagnoses:          [ ]  Mild Protein Calorie Malnutrition        [x]  Moderate Protein Calorie Malnutrition        [ ] Severe Protein Calorie Malnutrition        [ ] Unspecified Protein Calorie Malnutrition        [ ] Underweight / BMI <19        [ ] Morbid Obesity / BMI > 40    Pt presents with malnutrition (moderate, acute) related to inability to consume sufficient protein-energy associated with colon mass s/p ex lap, lysis of adhesions, ileocolic resection, and ventral hernia repair as evidenced by <50% energy intake >5 days, mild muscle loss of temples and clavicles, mild fat loss of triceps, and 1.5% wt loss x 6 days.     Findings as based on:  •  Comprehensive nutrition assessment and consultation  •  Calorie counts (nutrient intake analysis)  •  Food acceptance and intake status from observations by staff  •  Follow up  •  Patient education  •  Intervention secondary to interdisciplinary rounds  •   concerns      Treatment:    The following diet has been recommended: As medically feasible, advance diet to consistent carb, DASH/TLC, low fiber + gluten-free. Recommend vitamin C 500mg daily. Once diet advanced, recommend Glucerna TID to optimize po intake and provide an additional 220 kcal, 10g protein per serving.      PROVIDER Section:     By signing this assessment you are acknowledging and agree with the diagnosis/diagnoses assigned by the Registered Dietitian    Comments:

## 2018-12-07 NOTE — PROGRESS NOTE ADULT - ASSESSMENT
74 y/o male, now POD #4 Ex Lap, REAL, Ileocolic resection, ventral hernia repair. Found A&Ox3, NAD, supine in bed. Abdominal tympany on exam, some improvement compared to yesterday's encouter. No signs of oversedation. Discussed continued use of IV Tylenol for now. Oral Tylenol once doses completed.

## 2018-12-07 NOTE — DIETITIAN INITIAL EVALUATION ADULT. - OTHER INFO
72 y/o male, now POD #4 Ex Lap, REAL, Ileocolic resection, ventral hernia repair. Pt reports good appetite/po intake PTA.  lbs. Denies chewing/swallowing difficulty. States he checks blood sugars at home. Reports he is gluten intolerant and follows a gluten-free diet. Pt has been NPO/clears since admission (~7 days). C/o abdominal pain when consuming clears. Provided briefly with low fiber diet and meal planning with plate method diet ed- handouts left at bedside.

## 2018-12-07 NOTE — PROGRESS NOTE ADULT - ASSESSMENT
74 yo male with hld, htn, bph, who is admitted for intractable abdominal pain in setting of new abdominal mass concerning for neoplastic etiology    1. Abd pain sec to colon mass- now s/p ex-lap, R ileocolonic resection, and ventral hernia repair. pain meds per pain mngmt. clears started today and d/c IVF. tolerating well so far.     2. HLD, HTN- BP well controlled.  Cont rest of meds. ECHO noted.    3. DM2- uncontrolled accuchecks. but since post op and no PO intake yet will remain on HISS.    4. s/p hypophosphatemia- repleted    5. hypokalemia- replete    ICDs

## 2018-12-07 NOTE — DIETITIAN INITIAL EVALUATION ADULT. - MD RECOMMEND
vitamin C 500mg daily. Once diet advanced, recommend Glucerna TID to optimize po intake and provide an additional 220 kcal, 10g protein per serving

## 2018-12-07 NOTE — DIETITIAN INITIAL EVALUATION ADULT. - NS AS NUTRI INTERV MEDICAL AND FOOD SUPPLEMENTS
Diabetishinena TID automatically provided with CLD to provide an additional 150 kcal, 7g protein per serving/Commercial beverage

## 2018-12-07 NOTE — DIETITIAN INITIAL EVALUATION ADULT. - PHYSICAL APPEARANCE
BMI 27.4 (based on current weight) NFPE performed, physical findings consistent with mild muscle loss of temples and clavicles and mild fat loss of triceps/overweight

## 2018-12-07 NOTE — PROGRESS NOTE ADULT - SUBJECTIVE AND OBJECTIVE BOX
HEALTH ISSUES - PROBLEM Dx:    CC- now s/p ex-lap, R ileocolonic resection, and ventral hernia repair for intestinal mass    INTERVAL HPI/ OVERNIGHT EVENTS:    today clears started and he is tolerating same well   explained to eat/ drink small amounts     REVIEW OF SYSTEMS:    N/V -ve, abd distention +ve, pain -ve    Vital Signs Last 24 Hrs  T(C): 36.6 (07 Dec 2018 08:40), Max: 37.2 (07 Dec 2018 05:14)  T(F): 97.8 (07 Dec 2018 08:40), Max: 98.9 (07 Dec 2018 05:14)  HR: 68 (07 Dec 2018 08:40) (68 - 82)  BP: 150/71 (07 Dec 2018 08:40) (147/68 - 157/71)  BP(mean): --  RR: 18 (07 Dec 2018 11:34) (18 - 18)  SpO2: 97% (07 Dec 2018 11:34) (95% - 97%)    PHYSICAL EXAM-  General: NAD, AOx3, comfortable on examination  HEENT: PERRLA, EOMI, moist mucous membranes  Neck: supple, nontender  Cardiovascular: heart RRR, no murmurs  Respiratory: no respiratory distress, CTAB  Abdomen: soft, surgical site tenderness +ve mild distended. BS +ve. dressing C/D/I   Extremities: no peripheral edema. Normal ROM.  Integumentary: warm, no rash  Neuro: no motor or sensory deficits    LABS:                        10.5   7.4   )-----------( 216      ( 07 Dec 2018 06:56 )             30.5     12-07    142  |  105  |  12.0  ----------------------------<  149<H>  3.4<L>   |  24.0  |  0.71    Ca    8.6      07 Dec 2018 06:56  Phos  2.8     12-07  Mg     1.7     12-07    Assessment and Plan

## 2018-12-07 NOTE — PROGRESS NOTE ADULT - SUBJECTIVE AND OBJECTIVE BOX
Patient now s/p ex-lap, R ileocolonic resection, and ventral hernia repair.  No acute events over night. Patient continues to have gas pain, but states that he is less bloated today. He is passing flatus, no BM yet. Denies fever, chills, nausea, vomiting.    MEDICATIONS  (STANDING):  acetaminophen  IVPB .. 1000 milliGRAM(s) IV Intermittent once  acetaminophen  IVPB .. 1000 milliGRAM(s) IV Intermittent once  alvimopan 12 milliGRAM(s) Oral two times a day  atorvastatin 40 milliGRAM(s) Oral at bedtime  dextrose 50% Injectable 12.5 Gram(s) IV Push once  dextrose 50% Injectable 25 Gram(s) IV Push once  dextrose 50% Injectable 25 Gram(s) IV Push once  docusate sodium 100 milliGRAM(s) Oral two times a day  heparin  Injectable 5000 Unit(s) SubCutaneous every 8 hours  insulin lispro (HumaLOG) corrective regimen sliding scale   SubCutaneous three times a day before meals  lactated ringers. 1000 milliLiter(s) (125 mL/Hr) IV Continuous <Continuous>  lisinopril 2.5 milliGRAM(s) Oral daily  metoprolol tartrate 25 milliGRAM(s) Oral two times a day  tamsulosin 0.4 milliGRAM(s) Oral at bedtime    MEDICATIONS  (PRN):  acetaminophen   Tablet .. 650 milliGRAM(s) Oral every 6 hours PRN Temp greater or equal to 38C (100.4F), Moderate Pain (4 - 6)  dextrose 40% Gel 15 Gram(s) Oral once PRN Blood Glucose LESS THAN 70 milliGRAM(s)/deciliter  glucagon  Injectable 1 milliGRAM(s) IntraMuscular once PRN Glucose LESS THAN 70 milligrams/deciliter  HYDROmorphone  Injectable 0.5 milliGRAM(s) IV Push every 6 hours PRN Moderate and Severe Pain (4 - 10)  naloxone Injectable 0.1 milliGRAM(s) IV Push every 3 minutes PRN For ANY of the following changes in patient status:  A. RR LESS THAN 10 breaths per minute, B. Oxygen saturation LESS THAN 90%, C. Sedation score of 6  ondansetron Injectable 4 milliGRAM(s) IV Push every 6 hours PRN Nausea and/or Vomiting  ondansetron Injectable 4 milliGRAM(s) IV Push every 6 hours PRN Nausea  oxyCODONE    IR 5 milliGRAM(s) Oral every 4 hours PRN Moderate Pain (4 - 6)  oxyCODONE    IR 10 milliGRAM(s) Oral every 4 hours PRN Severe Pain (7 - 10)      Vital Signs Last 24 Hrs  T(C): 37.2 (07 Dec 2018 05:14), Max: 37.2 (07 Dec 2018 05:14)  T(F): 98.9 (07 Dec 2018 05:14), Max: 98.9 (07 Dec 2018 05:14)  HR: 77 (07 Dec 2018 05:14) (67 - 82)  BP: 153/69 (07 Dec 2018 05:14) (147/68 - 157/71)  BP(mean): --  RR: 18 (07 Dec 2018 05:14) (18 - 18)  SpO2: 95% (07 Dec 2018 05:14) (95% - 97%)    Physical Exam:   Gen: no acute distress  Pulm: CTAB  CV: RRR, normal S1 S2  Abd: soft, nontended, moderately distended, tympanitic,  no rebound, no guarding. AIMEE dressing midline in place.   Ext: no edema, cyanosis  Vasc: 2+ peripheral   Neuro: alert and oriented x 2    I&O's Detail    06 Dec 2018 07:01  -  07 Dec 2018 07:00  --------------------------------------------------------  IN:    lactated ringers.: 1500 mL    Solution: 200 mL  Total IN: 1700 mL    OUT:    Voided: 700 mL  Total OUT: 700 mL    Total NET: 1000 mL          LABS:                        10.5   7.4   )-----------( 216      ( 07 Dec 2018 06:56 )             30.5     12-07    142  |  105  |  12.0  ----------------------------<  149<H>  3.4<L>   |  24.0  |  0.71    Ca    8.6      07 Dec 2018 06:56  Phos  2.8     12-07  Mg     1.7     12-07            RADIOLOGY & ADDITIONAL STUDIES:

## 2018-12-07 NOTE — DIETITIAN INITIAL EVALUATION ADULT. - ETIOLOGY
related to inability to consume sufficient protein-energy associated with colon mass s/p ex lap, lysis of adhesions, ileocolic resection, and ventral hernia repair

## 2018-12-07 NOTE — DIETITIAN INITIAL EVALUATION ADULT. - NS AS NUTRI INTERV MEALS SNACK
Fiber - modified diet/Carbohydrate - modified diet/As medically feasible, advance diet to consistent carb, DASH/TLC, low fiber + gluten-free

## 2018-12-07 NOTE — PROGRESS NOTE ADULT - SUBJECTIVE AND OBJECTIVE BOX
Patient is a 73y old  Male who presents with a chief complaint of abdominal pain (07 Dec 2018 07:42). He is now POD #4 Ex Lap, REAL, Ileocolic resection, Ventral hernia repair.      VERBAL REPORT: "The pain is a little better today."  Patient reports passing flatus overnight and this AM. He admits relief with use of IV Tylenol  PAIN SCORE:                   SCALE USED: VNRS    Allergies  Gluten (Anaphylaxis)  No Known Drug Allergies      PAST MEDICAL & SURGICAL HISTORY:  Hyperlipidemia, unspecified hyperlipidemia type  Hypertension, unspecified type  Type 2 diabetes mellitus without complication, with long-term current use of insulin  Other acute appendicitis      MEDICATIONS  (STANDING):  acetaminophen  IVPB .. 1000 milliGRAM(s) IV Intermittent once  acetaminophen  IVPB .. 1000 milliGRAM(s) IV Intermittent once  alvimopan 12 milliGRAM(s) Oral two times a day  atorvastatin 40 milliGRAM(s) Oral at bedtime  dextrose 50% Injectable 12.5 Gram(s) IV Push once  dextrose 50% Injectable 25 Gram(s) IV Push once  dextrose 50% Injectable 25 Gram(s) IV Push once  docusate sodium 100 milliGRAM(s) Oral two times a day  heparin  Injectable 5000 Unit(s) SubCutaneous every 8 hours  insulin lispro (HumaLOG) corrective regimen sliding scale   SubCutaneous three times a day before meals  lisinopril 2.5 milliGRAM(s) Oral daily  metoprolol tartrate 25 milliGRAM(s) Oral two times a day  tamsulosin 0.4 milliGRAM(s) Oral at bedtime    MEDICATIONS  (PRN):  acetaminophen   Tablet .. 650 milliGRAM(s) Oral every 6 hours PRN Temp greater or equal to 38C (100.4F), Moderate Pain (4 - 6)  dextrose 40% Gel 15 Gram(s) Oral once PRN Blood Glucose LESS THAN 70 milliGRAM(s)/deciliter  glucagon  Injectable 1 milliGRAM(s) IntraMuscular once PRN Glucose LESS THAN 70 milligrams/deciliter  HYDROmorphone  Injectable 0.5 milliGRAM(s) IV Push every 6 hours PRN Moderate and Severe Pain (4 - 10)  naloxone Injectable 0.1 milliGRAM(s) IV Push every 3 minutes PRN For ANY of the following changes in patient status:  A. RR LESS THAN 10 breaths per minute, B. Oxygen saturation LESS THAN 90%, C. Sedation score of 6  ondansetron Injectable 4 milliGRAM(s) IV Push every 6 hours PRN Nausea and/or Vomiting  ondansetron Injectable 4 milliGRAM(s) IV Push every 6 hours PRN Nausea  oxyCODONE    IR 5 milliGRAM(s) Oral every 4 hours PRN Moderate Pain (4 - 6)  oxyCODONE    IR 10 milliGRAM(s) Oral every 4 hours PRN Severe Pain (7 - 10)      PHYSICAL EXAM:  GENERAL: NAD, well-developed  HEAD:  Atraumatic, Normocephalic  EYES: EOMI, PERRLA, conjunctiva and sclera clear  NERVOUS SYSTEM:  Alert & Oriented X3, Good concentration; Motor Strength 5/5 B/L upper and lower extremities  CHEST/LUNG: Clear speech, no SOB  ABDOMEN: Incisional Tenderness, Distention; Advance to clears    Vital Signs Last 24 Hrs  T(C): 36.6 (07 Dec 2018 08:40), Max: 37.2 (07 Dec 2018 05:14)  T(F): 97.8 (07 Dec 2018 08:40), Max: 98.9 (07 Dec 2018 05:14)  HR: 68 (07 Dec 2018 08:40) (68 - 82)  BP: 150/71 (07 Dec 2018 08:40) (147/68 - 157/71)  BP(mean): --  RR: 18 (07 Dec 2018 11:34) (18 - 18)  SpO2: 97% (07 Dec 2018 11:34) (95% - 97%)                          10.5   7.4   )-----------( 216      ( 07 Dec 2018 06:56 )             30.5

## 2018-12-07 NOTE — PROGRESS NOTE ADULT - ASSESSMENT
73 year old male found to have R colon mass on CT now s/p ex lap,  R ileocolonic resection, and ventral hernia repair, abdominal pain and distension improving.    - start CLD, d/c IVF  - Monitor for N/V; Low threshold to place NGT for decompression  - serial abdominal exams  - monitor bowel function  - follow up pathology   - Pain control with PO pain medication regimen  - Entereg  - O2 on 2L NC  - encourage ambulation  - DVT ppx with SCDs and lovenox

## 2018-12-08 LAB
ANION GAP SERPL CALC-SCNC: 11 MMOL/L — SIGNIFICANT CHANGE UP (ref 5–17)
BUN SERPL-MCNC: 13 MG/DL — SIGNIFICANT CHANGE UP (ref 8–20)
CALCIUM SERPL-MCNC: 9.1 MG/DL — SIGNIFICANT CHANGE UP (ref 8.6–10.2)
CHLORIDE SERPL-SCNC: 102 MMOL/L — SIGNIFICANT CHANGE UP (ref 98–107)
CO2 SERPL-SCNC: 27 MMOL/L — SIGNIFICANT CHANGE UP (ref 22–29)
CREAT SERPL-MCNC: 0.74 MG/DL — SIGNIFICANT CHANGE UP (ref 0.5–1.3)
GLUCOSE BLDC GLUCOMTR-MCNC: 145 MG/DL — HIGH (ref 70–99)
GLUCOSE BLDC GLUCOMTR-MCNC: 150 MG/DL — HIGH (ref 70–99)
GLUCOSE BLDC GLUCOMTR-MCNC: 173 MG/DL — HIGH (ref 70–99)
GLUCOSE BLDC GLUCOMTR-MCNC: 175 MG/DL — HIGH (ref 70–99)
GLUCOSE BLDC GLUCOMTR-MCNC: 181 MG/DL — HIGH (ref 70–99)
GLUCOSE SERPL-MCNC: 149 MG/DL — HIGH (ref 70–115)
MAGNESIUM SERPL-MCNC: 1.7 MG/DL — LOW (ref 1.8–2.6)
PHOSPHATE SERPL-MCNC: 3.1 MG/DL — SIGNIFICANT CHANGE UP (ref 2.4–4.7)
POTASSIUM SERPL-MCNC: 3.9 MMOL/L — SIGNIFICANT CHANGE UP (ref 3.5–5.3)
POTASSIUM SERPL-SCNC: 3.9 MMOL/L — SIGNIFICANT CHANGE UP (ref 3.5–5.3)
SODIUM SERPL-SCNC: 140 MMOL/L — SIGNIFICANT CHANGE UP (ref 135–145)

## 2018-12-08 PROCEDURE — 99232 SBSQ HOSP IP/OBS MODERATE 35: CPT

## 2018-12-08 RX ORDER — LISINOPRIL 2.5 MG/1
5 TABLET ORAL DAILY
Qty: 0 | Refills: 0 | Status: DISCONTINUED | OUTPATIENT
Start: 2018-12-09 | End: 2018-12-10

## 2018-12-08 RX ORDER — MAGNESIUM SULFATE 500 MG/ML
1 VIAL (ML) INJECTION ONCE
Qty: 0 | Refills: 0 | Status: COMPLETED | OUTPATIENT
Start: 2018-12-08 | End: 2018-12-08

## 2018-12-08 RX ORDER — LISINOPRIL 2.5 MG/1
2.5 TABLET ORAL ONCE
Qty: 0 | Refills: 0 | Status: COMPLETED | OUTPATIENT
Start: 2018-12-08 | End: 2018-12-08

## 2018-12-08 RX ADMIN — OXYCODONE HYDROCHLORIDE 10 MILLIGRAM(S): 5 TABLET ORAL at 22:30

## 2018-12-08 RX ADMIN — Medication 100 GRAM(S): at 14:19

## 2018-12-08 RX ADMIN — OXYCODONE HYDROCHLORIDE 10 MILLIGRAM(S): 5 TABLET ORAL at 18:20

## 2018-12-08 RX ADMIN — HEPARIN SODIUM 5000 UNIT(S): 5000 INJECTION INTRAVENOUS; SUBCUTANEOUS at 13:10

## 2018-12-08 RX ADMIN — Medication 25 MILLIGRAM(S): at 17:56

## 2018-12-08 RX ADMIN — ALVIMOPAN 12 MILLIGRAM(S): 12 CAPSULE ORAL at 17:28

## 2018-12-08 RX ADMIN — OXYCODONE HYDROCHLORIDE 10 MILLIGRAM(S): 5 TABLET ORAL at 00:54

## 2018-12-08 RX ADMIN — Medication 2: at 17:49

## 2018-12-08 RX ADMIN — LISINOPRIL 2.5 MILLIGRAM(S): 2.5 TABLET ORAL at 06:00

## 2018-12-08 RX ADMIN — Medication 650 MILLIGRAM(S): at 17:28

## 2018-12-08 RX ADMIN — Medication 650 MILLIGRAM(S): at 18:20

## 2018-12-08 RX ADMIN — Medication 650 MILLIGRAM(S): at 01:54

## 2018-12-08 RX ADMIN — Medication 650 MILLIGRAM(S): at 12:50

## 2018-12-08 RX ADMIN — Medication 25 MILLIGRAM(S): at 06:00

## 2018-12-08 RX ADMIN — OXYCODONE HYDROCHLORIDE 10 MILLIGRAM(S): 5 TABLET ORAL at 14:00

## 2018-12-08 RX ADMIN — HEPARIN SODIUM 5000 UNIT(S): 5000 INJECTION INTRAVENOUS; SUBCUTANEOUS at 05:58

## 2018-12-08 RX ADMIN — Medication 650 MILLIGRAM(S): at 05:58

## 2018-12-08 RX ADMIN — HEPARIN SODIUM 5000 UNIT(S): 5000 INJECTION INTRAVENOUS; SUBCUTANEOUS at 21:33

## 2018-12-08 RX ADMIN — Medication 100 MILLIGRAM(S): at 05:58

## 2018-12-08 RX ADMIN — ATORVASTATIN CALCIUM 40 MILLIGRAM(S): 80 TABLET, FILM COATED ORAL at 21:33

## 2018-12-08 RX ADMIN — ALVIMOPAN 12 MILLIGRAM(S): 12 CAPSULE ORAL at 05:58

## 2018-12-08 RX ADMIN — OXYCODONE HYDROCHLORIDE 10 MILLIGRAM(S): 5 TABLET ORAL at 17:28

## 2018-12-08 RX ADMIN — Medication 650 MILLIGRAM(S): at 00:53

## 2018-12-08 RX ADMIN — Medication 650 MILLIGRAM(S): at 07:00

## 2018-12-08 RX ADMIN — Medication 2: at 12:41

## 2018-12-08 RX ADMIN — LISINOPRIL 2.5 MILLIGRAM(S): 2.5 TABLET ORAL at 19:08

## 2018-12-08 RX ADMIN — OXYCODONE HYDROCHLORIDE 10 MILLIGRAM(S): 5 TABLET ORAL at 21:34

## 2018-12-08 RX ADMIN — Medication 100 MILLIGRAM(S): at 17:56

## 2018-12-08 RX ADMIN — OXYCODONE HYDROCHLORIDE 10 MILLIGRAM(S): 5 TABLET ORAL at 01:54

## 2018-12-08 RX ADMIN — Medication 650 MILLIGRAM(S): at 11:57

## 2018-12-08 RX ADMIN — OXYCODONE HYDROCHLORIDE 10 MILLIGRAM(S): 5 TABLET ORAL at 13:10

## 2018-12-08 RX ADMIN — TAMSULOSIN HYDROCHLORIDE 0.4 MILLIGRAM(S): 0.4 CAPSULE ORAL at 21:34

## 2018-12-08 NOTE — PROGRESS NOTE ADULT - SUBJECTIVE AND OBJECTIVE BOX
CC- s/p ex-lap, R ileocolonic resection, and ventral hernia repair for intestinal mass, elevated bp     INTERVAL HPI/ OVERNIGHT EVENTS: pt seen and evaluated at bedside. tolerating po clears. passing gas but no bm yet. deneis abd pain/ n/v. no fever or chills. no sob.     Vital Signs Last 24 Hrs  T(C): 36.9 (08 Dec 2018 16:33), Max: 37.3 (07 Dec 2018 21:32)  T(F): 98.5 (08 Dec 2018 16:33), Max: 99.2 (07 Dec 2018 21:32)  HR: 70 (08 Dec 2018 17:55) (64 - 74)  BP: 160/72 (08 Dec 2018 17:55) (145/64 - 175/87)  BP(mean): --  RR: 18 (08 Dec 2018 17:55) (18 - 18)  SpO2: 98% (08 Dec 2018 17:55) (95% - 98%)    PHYSICAL EXAM-  General: NAD, AOx3, comfortable on examination  HEENT: PERRLA, EOMI, moist mucous membranes  Neck: supple, nontender  Cardiovascular: heart RRR, no murmurs  Respiratory: no respiratory distress, CTAB  Abdomen: soft, surgical site tenderness +ve mild distended. BS +ve. dressing C/D/I   Extremities: no peripheral edema. Normal ROM.  Integumentary: warm, no rash  Neuro: no motor or sensory deficits    CBC Full  -  ( 07 Dec 2018 06:56 )  WBC Count : 7.4 K/uL  Hemoglobin : 10.5 g/dL  Hematocrit : 30.5 %  Platelet Count - Automated : 216 K/uL  Mean Cell Volume : 93.3 fl  Mean Cell Hemoglobin : 32.1 pg  Mean Cell Hemoglobin Concentration : 34.4 g/dL  Auto Neutrophil # : x  Auto Lymphocyte # : x  Auto Monocyte # : x  Auto Eosinophil # : x  Auto Basophil # : x  Auto Neutrophil % : x  Auto Lymphocyte % : x  Auto Monocyte % : x  Auto Eosinophil % : x  Auto Basophil % : x    12-08    140  |  102  |  13.0  ----------------------------<  149<H>  3.9   |  27.0  |  0.74    Ca    9.1      08 Dec 2018 07:09  Phos  3.1     12-08  Mg     1.7     12-08

## 2018-12-08 NOTE — PROGRESS NOTE ADULT - SUBJECTIVE AND OBJECTIVE BOX
INTERVAL HPI/OVERNIGHT EVENTS/SUBJECTIVE:  Patient now s/p ex-lap, R ileocolonic resection, and ventral hernia repair. Pt seen and examined with Dr. Perez.   No acute events over night. Patient continues to have gas pain, but states that he is less bloated today. He is passing flatus, no BM yet. Denies fever, chills, nausea, vomiting.    ICU Vital Signs Last 24 Hrs  T(C): 36.7 (08 Dec 2018 09:34), Max: 37.3 (07 Dec 2018 21:32)  T(F): 98 (08 Dec 2018 09:34), Max: 99.2 (07 Dec 2018 21:32)  HR: 69 (08 Dec 2018 12:07) (64 - 74)  BP: 168/77 (08 Dec 2018 12:07) (145/64 - 175/87)  BP(mean): --  ABP: --  ABP(mean): --  RR: 18 (08 Dec 2018 12:07) (18 - 18)  SpO2: 95% (08 Dec 2018 12:07) (95% - 97%)      I&O's Detail    07 Dec 2018 07:01  -  08 Dec 2018 07:00  --------------------------------------------------------  IN:    lactated ringers.: 125 mL    Solution: 300 mL  Total IN: 425 mL    OUT:    Voided: 200 mL  Total OUT: 200 mL    Total NET: 225 mL      MEDICATIONS  (STANDING):  acetaminophen   Tablet .. 650 milliGRAM(s) Oral every 6 hours  alvimopan 12 milliGRAM(s) Oral two times a day  atorvastatin 40 milliGRAM(s) Oral at bedtime  dextrose 50% Injectable 12.5 Gram(s) IV Push once  dextrose 50% Injectable 25 Gram(s) IV Push once  dextrose 50% Injectable 25 Gram(s) IV Push once  docusate sodium 100 milliGRAM(s) Oral two times a day  heparin  Injectable 5000 Unit(s) SubCutaneous every 8 hours  insulin lispro (HumaLOG) corrective regimen sliding scale   SubCutaneous three times a day before meals  lisinopril 2.5 milliGRAM(s) Oral daily  magnesium sulfate  IVPB 1 Gram(s) IV Intermittent once  metoprolol tartrate 25 milliGRAM(s) Oral two times a day  tamsulosin 0.4 milliGRAM(s) Oral at bedtime    MEDICATIONS  (PRN):  acetaminophen   Tablet .. 650 milliGRAM(s) Oral every 6 hours PRN Temp greater or equal to 38C (100.4F), Moderate Pain (4 - 6)  dextrose 40% Gel 15 Gram(s) Oral once PRN Blood Glucose LESS THAN 70 milliGRAM(s)/deciliter  glucagon  Injectable 1 milliGRAM(s) IntraMuscular once PRN Glucose LESS THAN 70 milligrams/deciliter  HYDROmorphone  Injectable 0.5 milliGRAM(s) IV Push every 6 hours PRN Moderate and Severe Pain (4 - 10)  naloxone Injectable 0.1 milliGRAM(s) IV Push every 3 minutes PRN For ANY of the following changes in patient status:  A. RR LESS THAN 10 breaths per minute, B. Oxygen saturation LESS THAN 90%, C. Sedation score of 6  ondansetron Injectable 4 milliGRAM(s) IV Push every 6 hours PRN Nausea and/or Vomiting  ondansetron Injectable 4 milliGRAM(s) IV Push every 6 hours PRN Nausea  oxyCODONE    IR 5 milliGRAM(s) Oral every 4 hours PRN Moderate Pain (4 - 6)  oxyCODONE    IR 10 milliGRAM(s) Oral every 4 hours PRN Severe Pain (7 - 10)    MISC:     PHYSICAL EXAM:  Gen: no acute distress  Pulm: CTAB  CV: RRR, normal S1 S2  Abd: soft, nontender, mild distension. AIMEE removed. staples to midline no bleeding or drainage. no surrounding erythema  Ext: no edema, cyanosis  Vasc: 2+ peripheral   Neuro: alert and oriented x 2    LABS:  CBC Full  -  ( 07 Dec 2018 06:56 )  WBC Count : 7.4 K/uL  Hemoglobin : 10.5 g/dL  Hematocrit : 30.5 %  Platelet Count - Automated : 216 K/uL  Mean Cell Volume : 93.3 fl  Mean Cell Hemoglobin : 32.1 pg  Mean Cell Hemoglobin Concentration : 34.4 g/dL  Auto Neutrophil # : x  Auto Lymphocyte # : x  Auto Monocyte # : x  Auto Eosinophil # : x  Auto Basophil # : x  Auto Neutrophil % : x  Auto Lymphocyte % : x  Auto Monocyte % : x  Auto Eosinophil % : x  Auto Basophil % : x    12-08    140  |  102  |  13.0  ----------------------------<  149<H>  3.9   |  27.0  |  0.74    Ca    9.1      08 Dec 2018 07:09  Phos  3.1     12-08  Mg     1.7     12-08      CARDIAC MARKERS ( 06 Dec 2018 16:21 )  x     / <0.01 ng/mL / x     / x     / x        ASSESSMENT/PLAN:  73yMale found to have R colon mass on CT now s/p ex lap,  R ileocolonic resection, and ventral hernia repair, abdominal pain and distension improving.    - regular diet  - Monitor for N/V; Low threshold to place NGT for decompression  - serial abdominal exams  - monitor bowel function  - follow up pathology   - Pain control with PO pain medication regimen  - Entereg  - encourage ambulation  - DVT ppx with SCDs and lovenox

## 2018-12-08 NOTE — PROGRESS NOTE ADULT - ASSESSMENT
74 yo male with hld, htn, bph, who is admitted for intractable abdominal pain in setting of new abdominal mass concerning for neoplastic etiology    1. Abd pain sec to colon mass- now s/p ex-lap, R ileocolonic resection, and ventral hernia repair. pain meds per pain mngmt. clears started today and d/c IVF. tolerating well so far.     2. HTN- BP elevated.  will increase lisinopril doese.  Cont rest of meds. ECHO noted.    3. DM2- uncontrolled accuchecks. but since post op and no PO intake yet will remain on HISS.    4. electrolyte abnl: monitor / replete     ICDs

## 2018-12-09 LAB
ANION GAP SERPL CALC-SCNC: 13 MMOL/L — SIGNIFICANT CHANGE UP (ref 5–17)
BUN SERPL-MCNC: 11 MG/DL — SIGNIFICANT CHANGE UP (ref 8–20)
CALCIUM SERPL-MCNC: 8.8 MG/DL — SIGNIFICANT CHANGE UP (ref 8.6–10.2)
CHLORIDE SERPL-SCNC: 101 MMOL/L — SIGNIFICANT CHANGE UP (ref 98–107)
CO2 SERPL-SCNC: 24 MMOL/L — SIGNIFICANT CHANGE UP (ref 22–29)
CREAT SERPL-MCNC: 0.73 MG/DL — SIGNIFICANT CHANGE UP (ref 0.5–1.3)
GLUCOSE BLDC GLUCOMTR-MCNC: 136 MG/DL — HIGH (ref 70–99)
GLUCOSE BLDC GLUCOMTR-MCNC: 152 MG/DL — HIGH (ref 70–99)
GLUCOSE BLDC GLUCOMTR-MCNC: 161 MG/DL — HIGH (ref 70–99)
GLUCOSE BLDC GLUCOMTR-MCNC: 192 MG/DL — HIGH (ref 70–99)
GLUCOSE SERPL-MCNC: 156 MG/DL — HIGH (ref 70–115)
HCT VFR BLD CALC: 31.9 % — LOW (ref 42–52)
HGB BLD-MCNC: 10.9 G/DL — LOW (ref 14–18)
MCHC RBC-ENTMCNC: 31.7 PG — HIGH (ref 27–31)
MCHC RBC-ENTMCNC: 34.2 G/DL — SIGNIFICANT CHANGE UP (ref 32–36)
MCV RBC AUTO: 92.7 FL — SIGNIFICANT CHANGE UP (ref 80–94)
PLATELET # BLD AUTO: 256 K/UL — SIGNIFICANT CHANGE UP (ref 150–400)
POTASSIUM SERPL-MCNC: 3.6 MMOL/L — SIGNIFICANT CHANGE UP (ref 3.5–5.3)
POTASSIUM SERPL-SCNC: 3.6 MMOL/L — SIGNIFICANT CHANGE UP (ref 3.5–5.3)
RBC # BLD: 3.44 M/UL — LOW (ref 4.6–6.2)
RBC # FLD: 12.1 % — SIGNIFICANT CHANGE UP (ref 11–15.6)
SODIUM SERPL-SCNC: 138 MMOL/L — SIGNIFICANT CHANGE UP (ref 135–145)
WBC # BLD: 8.8 K/UL — SIGNIFICANT CHANGE UP (ref 4.8–10.8)
WBC # FLD AUTO: 8.8 K/UL — SIGNIFICANT CHANGE UP (ref 4.8–10.8)

## 2018-12-09 PROCEDURE — 74018 RADEX ABDOMEN 1 VIEW: CPT | Mod: 26

## 2018-12-09 PROCEDURE — 99232 SBSQ HOSP IP/OBS MODERATE 35: CPT

## 2018-12-09 RX ORDER — ACETAMINOPHEN 500 MG
1000 TABLET ORAL ONCE
Qty: 0 | Refills: 0 | Status: COMPLETED | OUTPATIENT
Start: 2018-12-09 | End: 2018-12-09

## 2018-12-09 RX ADMIN — Medication 650 MILLIGRAM(S): at 07:11

## 2018-12-09 RX ADMIN — Medication 1000 MILLIGRAM(S): at 13:20

## 2018-12-09 RX ADMIN — Medication 2: at 08:29

## 2018-12-09 RX ADMIN — OXYCODONE HYDROCHLORIDE 10 MILLIGRAM(S): 5 TABLET ORAL at 08:47

## 2018-12-09 RX ADMIN — Medication 650 MILLIGRAM(S): at 00:10

## 2018-12-09 RX ADMIN — Medication 25 MILLIGRAM(S): at 06:15

## 2018-12-09 RX ADMIN — OXYCODONE HYDROCHLORIDE 10 MILLIGRAM(S): 5 TABLET ORAL at 17:02

## 2018-12-09 RX ADMIN — TAMSULOSIN HYDROCHLORIDE 0.4 MILLIGRAM(S): 0.4 CAPSULE ORAL at 21:42

## 2018-12-09 RX ADMIN — Medication 650 MILLIGRAM(S): at 06:00

## 2018-12-09 RX ADMIN — OXYCODONE HYDROCHLORIDE 10 MILLIGRAM(S): 5 TABLET ORAL at 09:40

## 2018-12-09 RX ADMIN — HEPARIN SODIUM 5000 UNIT(S): 5000 INJECTION INTRAVENOUS; SUBCUTANEOUS at 14:24

## 2018-12-09 RX ADMIN — ATORVASTATIN CALCIUM 40 MILLIGRAM(S): 80 TABLET, FILM COATED ORAL at 21:42

## 2018-12-09 RX ADMIN — Medication 650 MILLIGRAM(S): at 01:00

## 2018-12-09 RX ADMIN — HEPARIN SODIUM 5000 UNIT(S): 5000 INJECTION INTRAVENOUS; SUBCUTANEOUS at 21:42

## 2018-12-09 RX ADMIN — Medication 100 MILLIGRAM(S): at 17:03

## 2018-12-09 RX ADMIN — Medication 2: at 12:27

## 2018-12-09 RX ADMIN — Medication 2: at 17:16

## 2018-12-09 RX ADMIN — LISINOPRIL 5 MILLIGRAM(S): 2.5 TABLET ORAL at 06:15

## 2018-12-09 RX ADMIN — Medication 400 MILLIGRAM(S): at 12:54

## 2018-12-09 RX ADMIN — HEPARIN SODIUM 5000 UNIT(S): 5000 INJECTION INTRAVENOUS; SUBCUTANEOUS at 06:15

## 2018-12-09 RX ADMIN — Medication 100 MILLIGRAM(S): at 06:15

## 2018-12-09 RX ADMIN — OXYCODONE HYDROCHLORIDE 10 MILLIGRAM(S): 5 TABLET ORAL at 18:00

## 2018-12-09 RX ADMIN — ALVIMOPAN 12 MILLIGRAM(S): 12 CAPSULE ORAL at 07:14

## 2018-12-09 RX ADMIN — Medication 25 MILLIGRAM(S): at 17:00

## 2018-12-09 RX ADMIN — ALVIMOPAN 12 MILLIGRAM(S): 12 CAPSULE ORAL at 17:03

## 2018-12-09 NOTE — PROGRESS NOTE ADULT - ASSESSMENT
74 yo male with hld, htn, bph, who is admitted for intractable abdominal pain in setting of new abdominal mass concerning for neoplastic etiology    1. Abd pain sec to colon mass- now s/p ex-lap, R ileocolonic resection, and ventral hernia repair.   serial abd exams / KUB   monitor   ambulate   pain meds per pain mngmt..   tolerating clears     2. HTN- BP elevated. better controlled. c/w current dose of lisinopril     3. DM2- uncontrolled accuchecks. but since post op and no PO intake yet will remain on HISS.    4. electrolyte abnl: monitor / replete     ICDs

## 2018-12-09 NOTE — PROGRESS NOTE ADULT - SUBJECTIVE AND OBJECTIVE BOX
INTERVAL HPI/OVERNIGHT EVENTS/SUBJECTIVE:  Patient now s/p ex-lap, R ileocolonic resection, and ventral hernia repair.    No acute events over night. Patient continues to have gas pain, feels bloated. He is passing flatus, no BM yet. Denies fever, chills, nausea, vomiting.    ICU Vital Signs Last 24 Hrs  T(C): 37.1 (09 Dec 2018 08:13), Max: 37.1 (09 Dec 2018 08:13)  T(F): 98.7 (09 Dec 2018 08:13), Max: 98.7 (09 Dec 2018 08:13)  HR: 65 (09 Dec 2018 11:50) (65 - 74)  BP: 147/67 (09 Dec 2018 11:50) (147/67 - 169/83)  BP(mean): --  ABP: --  ABP(mean): --  RR: 18 (09 Dec 2018 11:50) (18 - 18)  SpO2: 96% (09 Dec 2018 11:50) (96% - 98%)      MEDICATIONS  (STANDING):  acetaminophen   Tablet .. 650 milliGRAM(s) Oral every 6 hours  alvimopan 12 milliGRAM(s) Oral two times a day  atorvastatin 40 milliGRAM(s) Oral at bedtime  dextrose 50% Injectable 12.5 Gram(s) IV Push once  dextrose 50% Injectable 25 Gram(s) IV Push once  dextrose 50% Injectable 25 Gram(s) IV Push once  docusate sodium 100 milliGRAM(s) Oral two times a day  heparin  Injectable 5000 Unit(s) SubCutaneous every 8 hours  insulin lispro (HumaLOG) corrective regimen sliding scale   SubCutaneous three times a day before meals  lisinopril 5 milliGRAM(s) Oral daily  metoprolol tartrate 25 milliGRAM(s) Oral two times a day  tamsulosin 0.4 milliGRAM(s) Oral at bedtime    MEDICATIONS  (PRN):  acetaminophen   Tablet .. 650 milliGRAM(s) Oral every 6 hours PRN Temp greater or equal to 38C (100.4F), Moderate Pain (4 - 6)  dextrose 40% Gel 15 Gram(s) Oral once PRN Blood Glucose LESS THAN 70 milliGRAM(s)/deciliter  glucagon  Injectable 1 milliGRAM(s) IntraMuscular once PRN Glucose LESS THAN 70 milligrams/deciliter  HYDROmorphone  Injectable 0.5 milliGRAM(s) IV Push every 6 hours PRN Moderate and Severe Pain (4 - 10)  naloxone Injectable 0.1 milliGRAM(s) IV Push every 3 minutes PRN For ANY of the following changes in patient status:  A. RR LESS THAN 10 breaths per minute, B. Oxygen saturation LESS THAN 90%, C. Sedation score of 6  ondansetron Injectable 4 milliGRAM(s) IV Push every 6 hours PRN Nausea and/or Vomiting  ondansetron Injectable 4 milliGRAM(s) IV Push every 6 hours PRN Nausea  oxyCODONE    IR 5 milliGRAM(s) Oral every 4 hours PRN Moderate Pain (4 - 6)  oxyCODONE    IR 10 milliGRAM(s) Oral every 4 hours PRN Severe Pain (7 - 10)      MISC:     PHYSICAL EXAM:  Gen: no acute distress  Pulm: non-labored, no accessory muscle use  CV: RRR, normal S1 S2  Abd: soft, nontender, distended. staples to midline no bleeding or drainage. no surrounding erythema. no guarding or rebound  Ext: no edema, cyanosis  Neuro: alert and oriented x 3      LABS:  CBC Full  -  ( 09 Dec 2018 07:31 )  WBC Count : 8.8 K/uL  Hemoglobin : 10.9 g/dL  Hematocrit : 31.9 %  Platelet Count - Automated : 256 K/uL  Mean Cell Volume : 92.7 fl  Mean Cell Hemoglobin : 31.7 pg  Mean Cell Hemoglobin Concentration : 34.2 g/dL  Auto Neutrophil # : x  Auto Lymphocyte # : x  Auto Monocyte # : x  Auto Eosinophil # : x  Auto Basophil # : x  Auto Neutrophil % : x  Auto Lymphocyte % : x  Auto Monocyte % : x  Auto Eosinophil % : x  Auto Basophil % : x    12-09    138  |  101  |  11.0  ----------------------------<  156<H>  3.6   |  24.0  |  0.73    Ca    8.8      09 Dec 2018 07:31  Phos  3.1     12-08  Mg     1.7     12-08    ASSESSMENT/PLAN:  73yMale  found to have R colon mass on CT now s/p ex lap,  R ileocolonic resection, and ventral hernia repair.   - CLD  - will obtain KUB   - Monitor for N/V; Low threshold to place NGT for decompression  - serial abdominal exams  - monitor bowel function  - follow up pathology   - Pain control with PO pain medication regimen  - Entereg  - encourage ambulation  - DVT ppx with SCDs and lovenox

## 2018-12-09 NOTE — PROGRESS NOTE ADULT - SUBJECTIVE AND OBJECTIVE BOX
CC- s/p ex-lap, R ileocolonic resection, and ventral hernia repair for intestinal mass, elevated bp     INTERVAL HPI/ OVERNIGHT EVENTS: pt seen and evaluated at bedside. tolerating po clears. had x 2 bm in am. deneis abd pain/ n/v. no fever or chills. no sob.     Vital Signs Last 24 Hrs  T(C): 37.1 (09 Dec 2018 08:13), Max: 37.1 (09 Dec 2018 08:13)  T(F): 98.7 (09 Dec 2018 08:13), Max: 98.7 (09 Dec 2018 08:13)  HR: 65 (09 Dec 2018 11:50) (65 - 74)  BP: 147/67 (09 Dec 2018 11:50) (147/67 - 169/83)  BP(mean): --  RR: 18 (09 Dec 2018 11:50) (18 - 18)  SpO2: 96% (09 Dec 2018 11:50) (96% - 98%)    PHYSICAL EXAM-  General: NAD, AOx3, comfortable on examination  HEENT: PERRLA, EOMI, moist mucous membranes  Neck: supple, nontender  Cardiovascular: heart RRR, no murmurs  Respiratory: no respiratory distress, CTAB  Abdomen: soft, surgical site tenderness +ve mild distended. BS +ve. dressing removed   Extremities: no peripheral edema. Normal ROM.  Integumentary: warm, no rash  Neuro: no motor or sensory deficits                          10.9   8.8   )-----------( 256      ( 09 Dec 2018 07:31 )             31.9   12-09    138  |  101  |  11.0  ----------------------------<  156<H>  3.6   |  24.0  |  0.73    Ca    8.8      09 Dec 2018 07:31  Phos  3.1     12-08  Mg     1.7     12-08

## 2018-12-10 ENCOUNTER — TRANSCRIPTION ENCOUNTER (OUTPATIENT)
Age: 73
End: 2018-12-10

## 2018-12-10 VITALS
HEART RATE: 66 BPM | DIASTOLIC BLOOD PRESSURE: 71 MMHG | TEMPERATURE: 100 F | OXYGEN SATURATION: 96 % | RESPIRATION RATE: 20 BRPM | SYSTOLIC BLOOD PRESSURE: 153 MMHG

## 2018-12-10 LAB
ALBUMIN SERPL ELPH-MCNC: 3.3 G/DL — SIGNIFICANT CHANGE UP (ref 3.3–5.2)
ALP SERPL-CCNC: 78 U/L — SIGNIFICANT CHANGE UP (ref 40–120)
ALT FLD-CCNC: 26 U/L — SIGNIFICANT CHANGE UP
ANION GAP SERPL CALC-SCNC: 12 MMOL/L — SIGNIFICANT CHANGE UP (ref 5–17)
AST SERPL-CCNC: 27 U/L — SIGNIFICANT CHANGE UP
BILIRUB SERPL-MCNC: 0.5 MG/DL — SIGNIFICANT CHANGE UP (ref 0.4–2)
BUN SERPL-MCNC: 12 MG/DL — SIGNIFICANT CHANGE UP (ref 8–20)
CALCIUM SERPL-MCNC: 8.8 MG/DL — SIGNIFICANT CHANGE UP (ref 8.6–10.2)
CHLORIDE SERPL-SCNC: 100 MMOL/L — SIGNIFICANT CHANGE UP (ref 98–107)
CO2 SERPL-SCNC: 27 MMOL/L — SIGNIFICANT CHANGE UP (ref 22–29)
CREAT SERPL-MCNC: 0.64 MG/DL — SIGNIFICANT CHANGE UP (ref 0.5–1.3)
GLUCOSE BLDC GLUCOMTR-MCNC: 168 MG/DL — HIGH (ref 70–99)
GLUCOSE BLDC GLUCOMTR-MCNC: 188 MG/DL — HIGH (ref 70–99)
GLUCOSE SERPL-MCNC: 229 MG/DL — HIGH (ref 70–115)
HCT VFR BLD CALC: 32.6 % — LOW (ref 42–52)
HGB BLD-MCNC: 11.2 G/DL — LOW (ref 14–18)
MCHC RBC-ENTMCNC: 32.1 PG — HIGH (ref 27–31)
MCHC RBC-ENTMCNC: 34.4 G/DL — SIGNIFICANT CHANGE UP (ref 32–36)
MCV RBC AUTO: 93.4 FL — SIGNIFICANT CHANGE UP (ref 80–94)
PLATELET # BLD AUTO: 268 K/UL — SIGNIFICANT CHANGE UP (ref 150–400)
POTASSIUM SERPL-MCNC: 3.5 MMOL/L — SIGNIFICANT CHANGE UP (ref 3.5–5.3)
POTASSIUM SERPL-SCNC: 3.5 MMOL/L — SIGNIFICANT CHANGE UP (ref 3.5–5.3)
PROT SERPL-MCNC: 6.4 G/DL — LOW (ref 6.6–8.7)
RBC # BLD: 3.49 M/UL — LOW (ref 4.6–6.2)
RBC # FLD: 12.2 % — SIGNIFICANT CHANGE UP (ref 11–15.6)
SODIUM SERPL-SCNC: 139 MMOL/L — SIGNIFICANT CHANGE UP (ref 135–145)
WBC # BLD: 8.2 K/UL — SIGNIFICANT CHANGE UP (ref 4.8–10.8)
WBC # FLD AUTO: 8.2 K/UL — SIGNIFICANT CHANGE UP (ref 4.8–10.8)

## 2018-12-10 PROCEDURE — 83735 ASSAY OF MAGNESIUM: CPT

## 2018-12-10 PROCEDURE — 84484 ASSAY OF TROPONIN QUANT: CPT

## 2018-12-10 PROCEDURE — 74018 RADEX ABDOMEN 1 VIEW: CPT

## 2018-12-10 PROCEDURE — 88307 TISSUE EXAM BY PATHOLOGIST: CPT

## 2018-12-10 PROCEDURE — 86850 RBC ANTIBODY SCREEN: CPT

## 2018-12-10 PROCEDURE — 96376 TX/PRO/DX INJ SAME DRUG ADON: CPT

## 2018-12-10 PROCEDURE — 74177 CT ABD & PELVIS W/CONTRAST: CPT

## 2018-12-10 PROCEDURE — 99285 EMERGENCY DEPT VISIT HI MDM: CPT | Mod: 25

## 2018-12-10 PROCEDURE — 86901 BLOOD TYPING SEROLOGIC RH(D): CPT

## 2018-12-10 PROCEDURE — 85610 PROTHROMBIN TIME: CPT

## 2018-12-10 PROCEDURE — 81001 URINALYSIS AUTO W/SCOPE: CPT

## 2018-12-10 PROCEDURE — 96374 THER/PROPH/DIAG INJ IV PUSH: CPT | Mod: XU

## 2018-12-10 PROCEDURE — 83036 HEMOGLOBIN GLYCOSYLATED A1C: CPT

## 2018-12-10 PROCEDURE — 86900 BLOOD TYPING SEROLOGIC ABO: CPT

## 2018-12-10 PROCEDURE — 85027 COMPLETE CBC AUTOMATED: CPT

## 2018-12-10 PROCEDURE — 83605 ASSAY OF LACTIC ACID: CPT

## 2018-12-10 PROCEDURE — 83690 ASSAY OF LIPASE: CPT

## 2018-12-10 PROCEDURE — 36415 COLL VENOUS BLD VENIPUNCTURE: CPT

## 2018-12-10 PROCEDURE — 93005 ELECTROCARDIOGRAM TRACING: CPT

## 2018-12-10 PROCEDURE — 99239 HOSP IP/OBS DSCHRG MGMT >30: CPT

## 2018-12-10 PROCEDURE — 84100 ASSAY OF PHOSPHORUS: CPT

## 2018-12-10 PROCEDURE — 82378 CARCINOEMBRYONIC ANTIGEN: CPT

## 2018-12-10 PROCEDURE — 85730 THROMBOPLASTIN TIME PARTIAL: CPT

## 2018-12-10 PROCEDURE — 80053 COMPREHEN METABOLIC PANEL: CPT

## 2018-12-10 PROCEDURE — 96375 TX/PRO/DX INJ NEW DRUG ADDON: CPT

## 2018-12-10 PROCEDURE — 80048 BASIC METABOLIC PNL TOTAL CA: CPT

## 2018-12-10 PROCEDURE — 82962 GLUCOSE BLOOD TEST: CPT

## 2018-12-10 PROCEDURE — 97163 PT EVAL HIGH COMPLEX 45 MIN: CPT

## 2018-12-10 PROCEDURE — 71250 CT THORAX DX C-: CPT

## 2018-12-10 PROCEDURE — 93306 TTE W/DOPPLER COMPLETE: CPT

## 2018-12-10 RX ORDER — LISINOPRIL 2.5 MG/1
1 TABLET ORAL
Qty: 0 | Refills: 0 | COMMUNITY

## 2018-12-10 RX ORDER — ACETAMINOPHEN 500 MG
2 TABLET ORAL
Qty: 0 | Refills: 0 | DISCHARGE
Start: 2018-12-10

## 2018-12-10 RX ORDER — LISINOPRIL 2.5 MG/1
1 TABLET ORAL
Qty: 30 | Refills: 0
Start: 2018-12-10 | End: 2019-01-08

## 2018-12-10 RX ORDER — DOCUSATE SODIUM 100 MG
1 CAPSULE ORAL
Qty: 60 | Refills: 0
Start: 2018-12-10 | End: 2019-01-08

## 2018-12-10 RX ORDER — ALVIMOPAN 12 MG/1
1 CAPSULE ORAL
Qty: 10 | Refills: 0 | OUTPATIENT
Start: 2018-12-10 | End: 2018-12-14

## 2018-12-10 RX ORDER — METOPROLOL TARTRATE 50 MG
1 TABLET ORAL
Qty: 60 | Refills: 0
Start: 2018-12-10 | End: 2019-01-08

## 2018-12-10 RX ADMIN — OXYCODONE HYDROCHLORIDE 10 MILLIGRAM(S): 5 TABLET ORAL at 05:09

## 2018-12-10 RX ADMIN — OXYCODONE HYDROCHLORIDE 10 MILLIGRAM(S): 5 TABLET ORAL at 06:00

## 2018-12-10 RX ADMIN — Medication 100 MILLIGRAM(S): at 05:09

## 2018-12-10 RX ADMIN — Medication 2: at 13:20

## 2018-12-10 RX ADMIN — Medication 2: at 09:35

## 2018-12-10 RX ADMIN — LISINOPRIL 5 MILLIGRAM(S): 2.5 TABLET ORAL at 05:09

## 2018-12-10 RX ADMIN — HEPARIN SODIUM 5000 UNIT(S): 5000 INJECTION INTRAVENOUS; SUBCUTANEOUS at 13:20

## 2018-12-10 RX ADMIN — HEPARIN SODIUM 5000 UNIT(S): 5000 INJECTION INTRAVENOUS; SUBCUTANEOUS at 05:09

## 2018-12-10 RX ADMIN — ALVIMOPAN 12 MILLIGRAM(S): 12 CAPSULE ORAL at 05:09

## 2018-12-10 RX ADMIN — Medication 25 MILLIGRAM(S): at 05:09

## 2018-12-10 NOTE — DISCHARGE NOTE ADULT - HOSPITAL COURSE
72 yo male with hld, htn, bph, who is admitted for intractable abdominal pain in setting of new abdominal mass concerning for neoplastic etiology    Abd pain sec to colon mass- now s/p ex-lap, R ileocolonic resection, and ventral hernia repair.   tolerating po diet. cleared by colorectal sx for dc home. patient to follow up with sx in 1 week for staple removal and bx results.     HTN- BP elevated. better controlled. c/w current dose of lisinopril and metoprolol    DM2- resume home meds     Vital Signs Last 24 Hrs  T(C): 37.4 (10 Dec 2018 08:22), Max: 37.4 (10 Dec 2018 08:22)  T(F): 99.4 (10 Dec 2018 08:22), Max: 99.4 (10 Dec 2018 08:22)  HR: 65 (10 Dec 2018 08:22) (65 - 74)  BP: 146/73 (10 Dec 2018 08:22) (141/74 - 183/81)  BP(mean): --  RR: 18 (10 Dec 2018 08:22) (18 - 18)  SpO2: 95% (10 Dec 2018 08:22) (93% - 98%)    PHYSICAL EXAM-  General: NAD, AOx3, comfortable on examination  HEENT: PERRLA, EOMI, moist mucous membranes  Neck: supple, nontender  Cardiovascular: heart RRR, no murmurs  Respiratory: no respiratory distress, CTAB  Abdomen: soft, surgical site tenderness +ve mild distended. BS +ve. dressing removed   Extremities: no peripheral edema. Normal ROM.  Integumentary: warm, no rash  Neuro: no motor or sensory deficits    plan of care has been discussed with patient and his daughter at bedside. they verbalize understanding. time spent for this dc 50 mins including discussion with sx and pt.

## 2018-12-10 NOTE — DISCHARGE NOTE ADULT - PLAN OF CARE
s/p resection follow up with colorectal surgery for staple removal and biopsy report in 1 week. continue with home meds and diabetic diet.  follow up with primary care in 1 week. continue with current medications.   follow up with primary care in 1 week.

## 2018-12-10 NOTE — PROGRESS NOTE ADULT - REASON FOR ADMISSION
abdominal pain

## 2018-12-10 NOTE — PROGRESS NOTE ADULT - SUBJECTIVE AND OBJECTIVE BOX
No acute events over night. Abdominal pain improving. Tolerating FLD, passing flatus, having BM. Reporting increased appetite. KUB shows improving ileus. Denies nausea, vomiting, fever, chills, constipation, diarrhea.    MEDICATIONS  (STANDING):  alvimopan 12 milliGRAM(s) Oral two times a day  atorvastatin 40 milliGRAM(s) Oral at bedtime  dextrose 50% Injectable 12.5 Gram(s) IV Push once  dextrose 50% Injectable 25 Gram(s) IV Push once  dextrose 50% Injectable 25 Gram(s) IV Push once  docusate sodium 100 milliGRAM(s) Oral two times a day  heparin  Injectable 5000 Unit(s) SubCutaneous every 8 hours  insulin lispro (HumaLOG) corrective regimen sliding scale   SubCutaneous three times a day before meals  lisinopril 5 milliGRAM(s) Oral daily  metoprolol tartrate 25 milliGRAM(s) Oral two times a day  tamsulosin 0.4 milliGRAM(s) Oral at bedtime    MEDICATIONS  (PRN):  acetaminophen   Tablet .. 650 milliGRAM(s) Oral every 6 hours PRN Temp greater or equal to 38C (100.4F), Moderate Pain (4 - 6)  dextrose 40% Gel 15 Gram(s) Oral once PRN Blood Glucose LESS THAN 70 milliGRAM(s)/deciliter  glucagon  Injectable 1 milliGRAM(s) IntraMuscular once PRN Glucose LESS THAN 70 milligrams/deciliter  naloxone Injectable 0.1 milliGRAM(s) IV Push every 3 minutes PRN For ANY of the following changes in patient status:  A. RR LESS THAN 10 breaths per minute, B. Oxygen saturation LESS THAN 90%, C. Sedation score of 6  ondansetron Injectable 4 milliGRAM(s) IV Push every 6 hours PRN Nausea and/or Vomiting  ondansetron Injectable 4 milliGRAM(s) IV Push every 6 hours PRN Nausea  oxyCODONE    IR 5 milliGRAM(s) Oral every 4 hours PRN Moderate Pain (4 - 6)  oxyCODONE    IR 10 milliGRAM(s) Oral every 4 hours PRN Severe Pain (7 - 10)      Vital Signs Last 24 Hrs  T(C): 36.8 (10 Dec 2018 04:56), Max: 37.1 (09 Dec 2018 08:13)  T(F): 98.3 (10 Dec 2018 04:56), Max: 98.7 (09 Dec 2018 08:13)  HR: 73 (10 Dec 2018 04:56) (65 - 74)  BP: 183/81 (10 Dec 2018 04:56) (141/74 - 183/81)  BP(mean): --  RR: 18 (10 Dec 2018 04:56) (18 - 18)  SpO2: 95% (10 Dec 2018 04:56) (93% - 98%)    Physical Exam:   Gen: no acute distress  Pulm: CTAB  CV: RRR, normal S1 S2  Abd: soft, non-tender, less distended,  no rebound, no guarding.   Ext: no edema, cyanosis. Midline incision with staples, c/d/i without evidence of indfection  Vasc: 2+ peripheral   Neuro: alert and oriented x 2      I&O's Detail      LABS:                        11.2   8.2   )-----------( 268      ( 10 Dec 2018 06:21 )             32.6     12-10    139  |  100  |  12.0  ----------------------------<  229<H>  3.5   |  27.0  |  0.64    Ca    8.8      10 Dec 2018 06:21    TPro  6.4<L>  /  Alb  3.3  /  TBili  0.5  /  DBili  x   /  AST  27  /  ALT  26  /  AlkPhos  78  12-10          RADIOLOGY & ADDITIONAL STUDIES:

## 2018-12-10 NOTE — DISCHARGE NOTE ADULT - CARE PROVIDER_API CALL
Radha Fields (MD), Surgery  [address] [place] [address]  Phone: [phone]  Fax: [phone]    primary care,   Phone: (   )    -  Fax: (   )    -

## 2018-12-10 NOTE — DISCHARGE NOTE ADULT - ADDITIONAL INSTRUCTIONS
may take shower but do not rub on staples.   follow up with colorectal surgery in 1 week for staple removal and

## 2018-12-10 NOTE — DISCHARGE NOTE ADULT - PATIENT PORTAL LINK FT
You can access the [facility] Patient Portal, offered by [facility], by registering with the following website: [email or web address]

## 2018-12-10 NOTE — DISCHARGE NOTE ADULT - MEDICATION SUMMARY - MEDICATIONS TO TAKE
I will START or STAY ON the medications listed below when I get home from the hospital:    acetaminophen 325 mg oral tablet  -- 2 tab(s) by mouth every 6 hours, As needed, Temp greater or equal to 38C (100.4F), Moderate Pain (4 - 6)  -- Indication: For Pain / fever     lisinopril 5 mg oral tablet  -- 1 tab(s) by mouth once a day  -- Indication: For Hypertension, unspecified type    tamsulosin 0.4 mg oral capsule  -- 1 cap(s) by mouth once a day  -- Indication: For bph    metFORMIN 500 mg oral tablet, extended release  -- 2 tab(s) by mouth 2 times a day  -- Indication: For diabetes    atorvastatin 40 mg oral tablet  -- 1 tab(s) by mouth once a day  -- Indication: For Hyperlipidemia, unspecified hyperlipidemia type    metoprolol tartrate 25 mg oral tablet  -- 1 tab(s) by mouth 2 times a day  -- Indication: For Hypertension, unspecified type    Entereg 12 mg oral capsule  -- 1 cap(s) by mouth 2 times a day  -- Indication: For bowel ileus     docusate sodium 100 mg oral capsule  -- 1 cap(s) by mouth 2 times a day  -- Indication: For bowel

## 2018-12-10 NOTE — PROGRESS NOTE ADULT - PROVIDER SPECIALTY LIST ADULT
Anesthesia
Colorectal Surgery
Hospitalist
Internal Medicine
Pain Medicine
Vascular Surgery
Hospitalist

## 2018-12-10 NOTE — DISCHARGE NOTE ADULT - CARE PLAN
Principal Discharge DX:	Intestinal mass  Goal:	s/p resection  Assessment and plan of treatment:	follow up with colorectal surgery for staple removal and biopsy report in 1 week.  Secondary Diagnosis:	Type 2 diabetes mellitus without complication, with long-term current use of insulin  Assessment and plan of treatment:	continue with home meds and diabetic diet.  follow up with primary care in 1 week.  Secondary Diagnosis:	Hypertension, unspecified type  Assessment and plan of treatment:	continue with current medications.   follow up with primary care in 1 week.

## 2018-12-10 NOTE — PROGRESS NOTE ADULT - ASSESSMENT
73 year old male found to have R colon mass on CT now s/p ex lap,  R ileocolonic resection, and ventral hernia repair, improved abdominal pain and distension with increased appetite    - FLD, possible advance today  - serial abdominal exams  - monitor bowel function  - follow up pathology   - Pain control with PO pain medication regimen  - Entereg  - encourage ambulation  - DVT ppx with SCDs and lovenox

## 2018-12-10 NOTE — CHART NOTE - NSCHARTNOTEFT_GEN_A_CORE
Source: Patient [x]  Family [ ]   other [x] EMR    Current Diet: Diet, Consistent Carbohydrate w/Evening Snack (12-10-18 @ 07:26)    Patient reports [ ] nausea  [ ] vomiting [ ] diarrhea [ ] constipation  [ ]chewing problems [ ] swallowing issues  [ ] other:     PO intake:  ~50%     Source for PO intake [x] Patient [ ] family [x] chart [ ] staff [ ] other    Current Weight:   (12/5) 194 lbs  (12/3) 197 lbs  No recent weights      Pertinent Medications: MEDICATIONS  (STANDING):  alvimopan 12 milliGRAM(s) Oral two times a day  atorvastatin 40 milliGRAM(s) Oral at bedtime  dextrose 50% Injectable 12.5 Gram(s) IV Push once  dextrose 50% Injectable 25 Gram(s) IV Push once  dextrose 50% Injectable 25 Gram(s) IV Push once  docusate sodium 100 milliGRAM(s) Oral two times a day  heparin  Injectable 5000 Unit(s) SubCutaneous every 8 hours  insulin lispro (HumaLOG) corrective regimen sliding scale   SubCutaneous three times a day before meals  lisinopril 5 milliGRAM(s) Oral daily  metoprolol tartrate 25 milliGRAM(s) Oral two times a day  tamsulosin 0.4 milliGRAM(s) Oral at bedtime    MEDICATIONS  (PRN):  acetaminophen   Tablet .. 650 milliGRAM(s) Oral every 6 hours PRN Temp greater or equal to 38C (100.4F), Moderate Pain (4 - 6)  dextrose 40% Gel 15 Gram(s) Oral once PRN Blood Glucose LESS THAN 70 milliGRAM(s)/deciliter  glucagon  Injectable 1 milliGRAM(s) IntraMuscular once PRN Glucose LESS THAN 70 milligrams/deciliter  naloxone Injectable 0.1 milliGRAM(s) IV Push every 3 minutes PRN For ANY of the following changes in patient status:  A. RR LESS THAN 10 breaths per minute, B. Oxygen saturation LESS THAN 90%, C. Sedation score of 6  ondansetron Injectable 4 milliGRAM(s) IV Push every 6 hours PRN Nausea and/or Vomiting  ondansetron Injectable 4 milliGRAM(s) IV Push every 6 hours PRN Nausea  oxyCODONE    IR 5 milliGRAM(s) Oral every 4 hours PRN Moderate Pain (4 - 6)  oxyCODONE    IR 10 milliGRAM(s) Oral every 4 hours PRN Severe Pain (7 - 10)    Pertinent Labs: CBC Full  -  ( 10 Dec 2018 06:21 )  WBC Count : 8.2 K/uL  Hemoglobin : 11.2 g/dL  Hematocrit : 32.6 %  Platelet Count - Automated : 268 K/uL  Mean Cell Volume : 93.4 fl  Mean Cell Hemoglobin : 32.1 pg  Mean Cell Hemoglobin Concentration : 34.4 g/dL  Auto Neutrophil # : x  Auto Lymphocyte # : x  Auto Monocyte # : x  Auto Eosinophil # : x  Auto Basophil # : x  Auto Neutrophil % : x  Auto Lymphocyte % : x  Auto Monocyte % : x  Auto Eosinophil % : x  Auto Basophil % : x    12-10 Na139 mmol/L Glu 229 mg/dL<H> K+ 3.5 mmol/L Cr  0.64 mg/dL BUN 12.0 mg/dL Phos n/a   Alb 3.3 g/dL PAB n/a       Skin: surgical incision to midline abdomen per documentation     Nutrition focused physical exam conducted - found signs of malnutrition [ ]absent [x]present    Subcutaneous fat loss: [ ] Orbital fat pads region, [ ]Buccal fat region, [x]Triceps region,  [ ]Ribs region    Muscle wasting: [x]Temples region, [x]Clavicle region, [ ]Shoulder region, [ ]Scapula region, [ ]Interosseous region,  [ ]thigh region, [ ]Calf region    Estimated Needs:   [x] no change since previous assessment  [ ] recalculated:     Current Nutrition Diagnosis: Pt presents with malnutrition (moderate, acute) related to inability to consume sufficient protein-energy associated with colon mass s/p ex lap, lysis of adhesions, ileocolic resection, and ventral hernia repair as evidenced by <50% energy intake >5 days, mild muscle loss of temples and clavicles, mild fat loss of triceps, and 1.5% wt loss x 6 days. Pt reports appetite is improving, still c/o mild abdominal pain. States he was tolerating full liquid diet, now awaiting breakfast tray as diet was advanced to solids. Last BM 12/9 per documentation.    Recommendations:   1) Change diet to consistent carb, DASH/TLC, low fiber + gluten-free.  2) Glucerna TID to optimize po intake and provide an additional 220 kcal, 10g protein per serving.  3) Encourage po intake and provide assistance at meals as needed.     Monitoring and Evaluation:   [x] PO intake [x] Tolerance to diet prescription [X] Weights  [X] Follow up per protocol [X] Labs

## 2018-12-11 PROBLEM — I10 ESSENTIAL (PRIMARY) HYPERTENSION: Chronic | Status: ACTIVE | Noted: 2018-12-01

## 2018-12-11 PROBLEM — E11.9 TYPE 2 DIABETES MELLITUS WITHOUT COMPLICATIONS: Chronic | Status: ACTIVE | Noted: 2018-12-01

## 2018-12-11 PROBLEM — E78.5 HYPERLIPIDEMIA, UNSPECIFIED: Chronic | Status: ACTIVE | Noted: 2018-12-01

## 2018-12-19 ENCOUNTER — APPOINTMENT (OUTPATIENT)
Dept: COLORECTAL SURGERY | Facility: CLINIC | Age: 73
End: 2018-12-19
Payer: MEDICARE

## 2018-12-19 VITALS
HEIGHT: 71 IN | TEMPERATURE: 98.8 F | SYSTOLIC BLOOD PRESSURE: 110 MMHG | WEIGHT: 190 LBS | BODY MASS INDEX: 26.6 KG/M2 | DIASTOLIC BLOOD PRESSURE: 62 MMHG

## 2018-12-19 PROCEDURE — 99024 POSTOP FOLLOW-UP VISIT: CPT

## 2019-01-08 ENCOUNTER — APPOINTMENT (OUTPATIENT)
Dept: COLORECTAL SURGERY | Facility: CLINIC | Age: 74
End: 2019-01-08
Payer: MEDICARE

## 2019-01-08 VITALS
SYSTOLIC BLOOD PRESSURE: 130 MMHG | RESPIRATION RATE: 14 BRPM | HEIGHT: 71 IN | WEIGHT: 190 LBS | TEMPERATURE: 97.6 F | DIASTOLIC BLOOD PRESSURE: 68 MMHG | BODY MASS INDEX: 26.6 KG/M2

## 2019-01-08 PROCEDURE — 99024 POSTOP FOLLOW-UP VISIT: CPT

## 2019-01-08 NOTE — ASSESSMENT
[FreeTextEntry1] : 73M s/p right hemicolectomy 2/2 large bowel obstruction here for 6 week post op followup. Patient doing well. Advised to return to normal activity. Patient should have routine f/u for screening colonoscopy in 2 years.

## 2019-01-08 NOTE — HISTORY OF PRESENT ILLNESS
[FreeTextEntry1] : 73M who presented to ED with large bowel obstruction concerning for cecal mass. He was taken to the operating room at [facility] and was found to have large bowel obstruction 2/2 cecal adhesions to inguinal mesh. A right hemicolectomy was performed. He has no complaints and is having regular bowel movements. \par \par

## 2019-01-08 NOTE — PHYSICAL EXAM
[Abdomen Masses] : No abdominal masses [Abdomen Tenderness] : ~T No ~M abdominal tenderness [Enlarged] : not enlarged [Tender] : nontender [JVD] : no jugular venous distention  [Respiratory Effort] : normal respiratory effort [Normal Rate and Rhythm] : normal rate and rhythm [No Rash or Lesion] : No rash or lesion [Alert] : alert [Oriented to Person] : oriented to person [Oriented to Place] : oriented to place [Calm] : calm [de-identified] : incision well healed, no incisional hernia on valsalva [de-identified] : NAD [de-identified] :  normocephalic, atraumatic.

## 2019-03-24 ENCOUNTER — INPATIENT (INPATIENT)
Facility: HOSPITAL | Age: 74
LOS: 1 days | Discharge: HOME | DRG: 871 | End: 2019-03-26
Attending: HOSPITALIST | Admitting: HOSPITALIST
Payer: MEDICARE

## 2019-03-24 VITALS
RESPIRATION RATE: 16 BRPM | SYSTOLIC BLOOD PRESSURE: 201 MMHG | OXYGEN SATURATION: 95 % | DIASTOLIC BLOOD PRESSURE: 98 MMHG | HEART RATE: 80 BPM | TEMPERATURE: 101 F

## 2019-03-24 DIAGNOSIS — K35.890 OTHER ACUTE APPENDICITIS WITHOUT PERFORATION OR GANGRENE: Chronic | ICD-10-CM

## 2019-03-24 LAB
ALBUMIN SERPL ELPH-MCNC: 4.5 G/DL — SIGNIFICANT CHANGE UP (ref 3.3–5.2)
ALP SERPL-CCNC: 65 U/L — SIGNIFICANT CHANGE UP (ref 40–120)
ALT FLD-CCNC: 28 U/L — SIGNIFICANT CHANGE UP
ANION GAP SERPL CALC-SCNC: 15 MMOL/L — SIGNIFICANT CHANGE UP (ref 5–17)
APPEARANCE UR: CLEAR — SIGNIFICANT CHANGE UP
APTT BLD: 25.4 SEC — LOW (ref 27.5–36.3)
AST SERPL-CCNC: 20 U/L — SIGNIFICANT CHANGE UP
BACTERIA # UR AUTO: ABNORMAL
BASOPHILS # BLD AUTO: 0 K/UL — SIGNIFICANT CHANGE UP (ref 0–0.2)
BASOPHILS NFR BLD AUTO: 0.2 % — SIGNIFICANT CHANGE UP (ref 0–2)
BILIRUB SERPL-MCNC: 0.4 MG/DL — SIGNIFICANT CHANGE UP (ref 0.4–2)
BILIRUB UR-MCNC: NEGATIVE — SIGNIFICANT CHANGE UP
BUN SERPL-MCNC: 14 MG/DL — SIGNIFICANT CHANGE UP (ref 8–20)
CALCIUM SERPL-MCNC: 9.1 MG/DL — SIGNIFICANT CHANGE UP (ref 8.6–10.2)
CHLORIDE SERPL-SCNC: 95 MMOL/L — LOW (ref 98–107)
CK MB CFR SERPL CALC: 3.8 NG/ML — SIGNIFICANT CHANGE UP (ref 0–6.7)
CK SERPL-CCNC: 200 U/L — SIGNIFICANT CHANGE UP (ref 30–200)
CO2 SERPL-SCNC: 22 MMOL/L — SIGNIFICANT CHANGE UP (ref 22–29)
COLOR SPEC: YELLOW — SIGNIFICANT CHANGE UP
CREAT SERPL-MCNC: 0.84 MG/DL — SIGNIFICANT CHANGE UP (ref 0.5–1.3)
DIFF PNL FLD: ABNORMAL
EOSINOPHIL # BLD AUTO: 0.1 K/UL — SIGNIFICANT CHANGE UP (ref 0–0.5)
EOSINOPHIL NFR BLD AUTO: 2.5 % — SIGNIFICANT CHANGE UP (ref 0–5)
EPI CELLS # UR: SIGNIFICANT CHANGE UP
GLUCOSE SERPL-MCNC: 189 MG/DL — HIGH (ref 70–115)
GLUCOSE UR QL: 250 MG/DL
HCT VFR BLD CALC: 33.1 % — LOW (ref 42–52)
HGB BLD-MCNC: 11.2 G/DL — LOW (ref 14–18)
INR BLD: 1.11 RATIO — SIGNIFICANT CHANGE UP (ref 0.88–1.16)
KETONES UR-MCNC: NEGATIVE — SIGNIFICANT CHANGE UP
LACTATE BLDV-MCNC: 1.4 MMOL/L — SIGNIFICANT CHANGE UP (ref 0.5–2)
LEUKOCYTE ESTERASE UR-ACNC: NEGATIVE — SIGNIFICANT CHANGE UP
LIDOCAIN IGE QN: 37 U/L — SIGNIFICANT CHANGE UP (ref 22–51)
LYMPHOCYTES # BLD AUTO: 0.7 K/UL — LOW (ref 1–4.8)
LYMPHOCYTES # BLD AUTO: 11.8 % — LOW (ref 20–55)
MCHC RBC-ENTMCNC: 31.2 PG — HIGH (ref 27–31)
MCHC RBC-ENTMCNC: 33.8 G/DL — SIGNIFICANT CHANGE UP (ref 32–36)
MCV RBC AUTO: 92.2 FL — SIGNIFICANT CHANGE UP (ref 80–94)
MONOCYTES # BLD AUTO: 0.5 K/UL — SIGNIFICANT CHANGE UP (ref 0–0.8)
MONOCYTES NFR BLD AUTO: 8.5 % — SIGNIFICANT CHANGE UP (ref 3–10)
NEUTROPHILS # BLD AUTO: 4.4 K/UL — SIGNIFICANT CHANGE UP (ref 1.8–8)
NEUTROPHILS NFR BLD AUTO: 76.8 % — HIGH (ref 37–73)
NITRITE UR-MCNC: NEGATIVE — SIGNIFICANT CHANGE UP
PH UR: 7 — SIGNIFICANT CHANGE UP (ref 5–8)
PLATELET # BLD AUTO: 134 K/UL — LOW (ref 150–400)
POTASSIUM SERPL-MCNC: 4.2 MMOL/L — SIGNIFICANT CHANGE UP (ref 3.5–5.3)
POTASSIUM SERPL-SCNC: 4.2 MMOL/L — SIGNIFICANT CHANGE UP (ref 3.5–5.3)
PROT SERPL-MCNC: 7.4 G/DL — SIGNIFICANT CHANGE UP (ref 6.6–8.7)
PROT UR-MCNC: 30 MG/DL
PROTHROM AB SERPL-ACNC: 12.8 SEC — SIGNIFICANT CHANGE UP (ref 10–12.9)
RBC # BLD: 3.59 M/UL — LOW (ref 4.6–6.2)
RBC # FLD: 12.6 % — SIGNIFICANT CHANGE UP (ref 11–15.6)
RBC CASTS # UR COMP ASSIST: ABNORMAL /HPF (ref 0–4)
SODIUM SERPL-SCNC: 132 MMOL/L — LOW (ref 135–145)
SP GR SPEC: 1 — LOW (ref 1.01–1.02)
TROPONIN T SERPL-MCNC: <0.01 NG/ML — SIGNIFICANT CHANGE UP (ref 0–0.06)
TSH SERPL-MCNC: 5.51 UIU/ML — HIGH (ref 0.27–4.2)
UROBILINOGEN FLD QL: NEGATIVE MG/DL — SIGNIFICANT CHANGE UP
WBC # BLD: 5.7 K/UL — SIGNIFICANT CHANGE UP (ref 4.8–10.8)
WBC # FLD AUTO: 5.7 K/UL — SIGNIFICANT CHANGE UP (ref 4.8–10.8)
WBC UR QL: SIGNIFICANT CHANGE UP

## 2019-03-24 PROCEDURE — 93010 ELECTROCARDIOGRAM REPORT: CPT

## 2019-03-24 PROCEDURE — 99285 EMERGENCY DEPT VISIT HI MDM: CPT

## 2019-03-24 PROCEDURE — 71045 X-RAY EXAM CHEST 1 VIEW: CPT | Mod: 26

## 2019-03-24 PROCEDURE — 70450 CT HEAD/BRAIN W/O DYE: CPT | Mod: 26

## 2019-03-24 RX ORDER — ACETAMINOPHEN 500 MG
650 TABLET ORAL ONCE
Qty: 0 | Refills: 0 | Status: COMPLETED | OUTPATIENT
Start: 2019-03-24 | End: 2019-03-24

## 2019-03-24 RX ORDER — SODIUM CHLORIDE 9 MG/ML
2500 INJECTION INTRAMUSCULAR; INTRAVENOUS; SUBCUTANEOUS ONCE
Qty: 0 | Refills: 0 | Status: COMPLETED | OUTPATIENT
Start: 2019-03-24 | End: 2019-03-24

## 2019-03-24 RX ORDER — CEFTRIAXONE 500 MG/1
1 INJECTION, POWDER, FOR SOLUTION INTRAMUSCULAR; INTRAVENOUS ONCE
Qty: 0 | Refills: 0 | Status: COMPLETED | OUTPATIENT
Start: 2019-03-24 | End: 2019-03-24

## 2019-03-24 RX ADMIN — SODIUM CHLORIDE 3333.33 MILLILITER(S): 9 INJECTION INTRAMUSCULAR; INTRAVENOUS; SUBCUTANEOUS at 21:23

## 2019-03-24 RX ADMIN — CEFTRIAXONE 100 GRAM(S): 500 INJECTION, POWDER, FOR SOLUTION INTRAMUSCULAR; INTRAVENOUS at 21:22

## 2019-03-24 RX ADMIN — Medication 650 MILLIGRAM(S): at 21:21

## 2019-03-24 NOTE — ED PROVIDER NOTE - CLINICAL SUMMARY MEDICAL DECISION MAKING FREE TEXT BOX
following commands do not suspect clincaly bacterial  menignitsi has cough + chlaym pneum and ingfluenza possible bacteremic cauign confusion head ct clean egdt fluids admission too high riks detioration if sent home

## 2019-03-24 NOTE — ED ADULT NURSE NOTE - OBJECTIVE STATEMENT
pt biba A+Ox2 from home with c/o sudden onset of headache, and confusion per grandson , pt saw pmd yesterday and was started on amoxicillin and cough syrup for fever, URI, and generalized weakness for  the past 3 days. denies chest pain or sob, Daughter arrived and stated that the cough syrup bottle is half empty ( filled yesterday - last seen full at 6pm). + ataxic when ambulating requiring 2 person assist. skin warm to touch.

## 2019-03-24 NOTE — ED PROVIDER NOTE - ENMT, MLM
Airway patent, dry mucous membranes. Throat has no vesicles, no oropharyngeal exudates and uvula is midline.

## 2019-03-24 NOTE — ED ADULT NURSE NOTE - NSIMPLEMENTINTERV_GEN_ALL_ED
Implemented All Fall with Harm Risk Interventions:  [place] to call system. Call bell, personal items and telephone within reach. Instruct patient to call for assistance. Room bathroom lighting operational. Non-slip footwear when patient is off stretcher. Physically safe environment: no spills, clutter or unnecessary equipment. Stretcher in lowest position, wheels locked, appropriate side rails in place. Provide visual cue, wrist band, yellow gown, etc. Monitor gait and stability. Monitor for mental status changes and reorient to person, place, and time. Review medications for side effects contributing to fall risk. Reinforce activity limits and safety measures with patient and family. Provide visual clues: red socks.

## 2019-03-24 NOTE — ED PROVIDER NOTE - OBJECTIVE STATEMENT
74 y/o M pt with hx of HLD, HTN, DM, and appendicitis presents to ED c/o fever and generalized weakness that began 3 days ago. Pt's grandson states he was confused yesterday. Pt saw his PMD for cough and fever and was place on a Z pack. Per daughter, pt was given cough syrup by his PMD yesterday and she noticed the bottle was empty today. Per EMS, BG in the field was 169. denies HA or neck pain. no chest pain or sob. no abd pain. no n/v/d. no urinary f/u/d. no back pain. no motor or sensory deficits. denies illicit drug use. no recent travel. no rash. no other acute issues symptoms or concerns

## 2019-03-24 NOTE — ED ADULT TRIAGE NOTE - CHIEF COMPLAINT QUOTE
Pt A&Ox2 states "have headache." BIBA c/o being altered with headache. Patient is febrile skin hot and dry only A&Ox2 at this time. Oral temp 101.2. Code sepsis called with code team.

## 2019-03-25 DIAGNOSIS — J18.9 PNEUMONIA, UNSPECIFIED ORGANISM: ICD-10-CM

## 2019-03-25 LAB
ALBUMIN SERPL ELPH-MCNC: 4 G/DL — SIGNIFICANT CHANGE UP (ref 3.3–5.2)
ALP SERPL-CCNC: 58 U/L — SIGNIFICANT CHANGE UP (ref 40–120)
ALT FLD-CCNC: 25 U/L — SIGNIFICANT CHANGE UP
ANION GAP SERPL CALC-SCNC: 14 MMOL/L — SIGNIFICANT CHANGE UP (ref 5–17)
AST SERPL-CCNC: 18 U/L — SIGNIFICANT CHANGE UP
BASOPHILS # BLD AUTO: 0 K/UL — SIGNIFICANT CHANGE UP (ref 0–0.2)
BASOPHILS NFR BLD AUTO: 0.2 % — SIGNIFICANT CHANGE UP (ref 0–2)
BILIRUB SERPL-MCNC: 0.4 MG/DL — SIGNIFICANT CHANGE UP (ref 0.4–2)
BUN SERPL-MCNC: 11 MG/DL — SIGNIFICANT CHANGE UP (ref 8–20)
C PNEUM DNA SPEC QL NAA+PROBE: DETECTED
CALCIUM SERPL-MCNC: 8.9 MG/DL — SIGNIFICANT CHANGE UP (ref 8.6–10.2)
CHLORIDE SERPL-SCNC: 100 MMOL/L — SIGNIFICANT CHANGE UP (ref 98–107)
CO2 SERPL-SCNC: 24 MMOL/L — SIGNIFICANT CHANGE UP (ref 22–29)
CREAT SERPL-MCNC: 0.8 MG/DL — SIGNIFICANT CHANGE UP (ref 0.5–1.3)
EOSINOPHIL # BLD AUTO: 0.1 K/UL — SIGNIFICANT CHANGE UP (ref 0–0.5)
EOSINOPHIL NFR BLD AUTO: 1.9 % — SIGNIFICANT CHANGE UP (ref 0–5)
FLUAV H1 2009 PAND RNA SPEC QL NAA+PROBE: DETECTED
FLUAV SUBTYP SPEC NAA+PROBE: DETECTED
GLUCOSE BLDC GLUCOMTR-MCNC: 123 MG/DL — HIGH (ref 70–99)
GLUCOSE BLDC GLUCOMTR-MCNC: 173 MG/DL — HIGH (ref 70–99)
GLUCOSE BLDC GLUCOMTR-MCNC: 174 MG/DL — HIGH (ref 70–99)
GLUCOSE BLDC GLUCOMTR-MCNC: 203 MG/DL — HIGH (ref 70–99)
GLUCOSE BLDC GLUCOMTR-MCNC: 252 MG/DL — HIGH (ref 70–99)
GLUCOSE SERPL-MCNC: 122 MG/DL — HIGH (ref 70–115)
HCT VFR BLD CALC: 31.5 % — LOW (ref 42–52)
HGB BLD-MCNC: 10.6 G/DL — LOW (ref 14–18)
LACTATE BLDV-MCNC: 1.6 MMOL/L — SIGNIFICANT CHANGE UP (ref 0.5–2)
LYMPHOCYTES # BLD AUTO: 1.3 K/UL — SIGNIFICANT CHANGE UP (ref 1–4.8)
LYMPHOCYTES # BLD AUTO: 27.4 % — SIGNIFICANT CHANGE UP (ref 20–55)
MAGNESIUM SERPL-MCNC: 1.9 MG/DL — SIGNIFICANT CHANGE UP (ref 1.6–2.6)
MCHC RBC-ENTMCNC: 31.4 PG — HIGH (ref 27–31)
MCHC RBC-ENTMCNC: 33.7 G/DL — SIGNIFICANT CHANGE UP (ref 32–36)
MCV RBC AUTO: 93.2 FL — SIGNIFICANT CHANGE UP (ref 80–94)
MONOCYTES # BLD AUTO: 0.7 K/UL — SIGNIFICANT CHANGE UP (ref 0–0.8)
MONOCYTES NFR BLD AUTO: 15 % — HIGH (ref 3–10)
NEUTROPHILS # BLD AUTO: 2.6 K/UL — SIGNIFICANT CHANGE UP (ref 1.8–8)
NEUTROPHILS NFR BLD AUTO: 55.5 % — SIGNIFICANT CHANGE UP (ref 37–73)
PHOSPHATE SERPL-MCNC: 3.1 MG/DL — SIGNIFICANT CHANGE UP (ref 2.4–4.7)
PLATELET # BLD AUTO: 142 K/UL — LOW (ref 150–400)
POTASSIUM SERPL-MCNC: 3.8 MMOL/L — SIGNIFICANT CHANGE UP (ref 3.5–5.3)
POTASSIUM SERPL-SCNC: 3.8 MMOL/L — SIGNIFICANT CHANGE UP (ref 3.5–5.3)
PROT SERPL-MCNC: 7.2 G/DL — SIGNIFICANT CHANGE UP (ref 6.6–8.7)
RAPID RVP RESULT: DETECTED
RBC # BLD: 3.38 M/UL — LOW (ref 4.6–6.2)
RBC # FLD: 12.8 % — SIGNIFICANT CHANGE UP (ref 11–15.6)
SODIUM SERPL-SCNC: 138 MMOL/L — SIGNIFICANT CHANGE UP (ref 135–145)
WBC # BLD: 4.7 K/UL — LOW (ref 4.8–10.8)
WBC # FLD AUTO: 4.7 K/UL — LOW (ref 4.8–10.8)

## 2019-03-25 PROCEDURE — 12345: CPT | Mod: NC

## 2019-03-25 PROCEDURE — 99222 1ST HOSP IP/OBS MODERATE 55: CPT

## 2019-03-25 PROCEDURE — 99223 1ST HOSP IP/OBS HIGH 75: CPT

## 2019-03-25 RX ORDER — INSULIN LISPRO 100/ML
VIAL (ML) SUBCUTANEOUS
Qty: 0 | Refills: 0 | Status: DISCONTINUED | OUTPATIENT
Start: 2019-03-25 | End: 2019-03-26

## 2019-03-25 RX ORDER — TAMSULOSIN HYDROCHLORIDE 0.4 MG/1
0.4 CAPSULE ORAL AT BEDTIME
Qty: 0 | Refills: 0 | Status: DISCONTINUED | OUTPATIENT
Start: 2019-03-25 | End: 2019-03-26

## 2019-03-25 RX ORDER — DEXTROSE 50 % IN WATER 50 %
25 SYRINGE (ML) INTRAVENOUS ONCE
Qty: 0 | Refills: 0 | Status: DISCONTINUED | OUTPATIENT
Start: 2019-03-25 | End: 2019-03-26

## 2019-03-25 RX ORDER — LISINOPRIL 2.5 MG/1
5 TABLET ORAL DAILY
Qty: 0 | Refills: 0 | Status: DISCONTINUED | OUTPATIENT
Start: 2019-03-25 | End: 2019-03-26

## 2019-03-25 RX ORDER — DEXTROSE 50 % IN WATER 50 %
12.5 SYRINGE (ML) INTRAVENOUS ONCE
Qty: 0 | Refills: 0 | Status: DISCONTINUED | OUTPATIENT
Start: 2019-03-25 | End: 2019-03-26

## 2019-03-25 RX ORDER — DEXTROSE 50 % IN WATER 50 %
15 SYRINGE (ML) INTRAVENOUS ONCE
Qty: 0 | Refills: 0 | Status: DISCONTINUED | OUTPATIENT
Start: 2019-03-25 | End: 2019-03-26

## 2019-03-25 RX ORDER — GLUCAGON INJECTION, SOLUTION 0.5 MG/.1ML
1 INJECTION, SOLUTION SUBCUTANEOUS ONCE
Qty: 0 | Refills: 0 | Status: DISCONTINUED | OUTPATIENT
Start: 2019-03-25 | End: 2019-03-26

## 2019-03-25 RX ORDER — ENOXAPARIN SODIUM 100 MG/ML
40 INJECTION SUBCUTANEOUS EVERY 24 HOURS
Qty: 0 | Refills: 0 | Status: DISCONTINUED | OUTPATIENT
Start: 2019-03-25 | End: 2019-03-26

## 2019-03-25 RX ORDER — ACETAMINOPHEN 500 MG
650 TABLET ORAL EVERY 6 HOURS
Qty: 0 | Refills: 0 | Status: DISCONTINUED | OUTPATIENT
Start: 2019-03-25 | End: 2019-03-26

## 2019-03-25 RX ORDER — AZITHROMYCIN 500 MG/1
500 TABLET, FILM COATED ORAL ONCE
Qty: 0 | Refills: 0 | Status: COMPLETED | OUTPATIENT
Start: 2019-03-25 | End: 2019-03-25

## 2019-03-25 RX ORDER — IPRATROPIUM/ALBUTEROL SULFATE 18-103MCG
3 AEROSOL WITH ADAPTER (GRAM) INHALATION EVERY 6 HOURS
Qty: 0 | Refills: 0 | Status: DISCONTINUED | OUTPATIENT
Start: 2019-03-25 | End: 2019-03-26

## 2019-03-25 RX ORDER — SODIUM CHLORIDE 9 MG/ML
1000 INJECTION, SOLUTION INTRAVENOUS
Qty: 0 | Refills: 0 | Status: DISCONTINUED | OUTPATIENT
Start: 2019-03-25 | End: 2019-03-26

## 2019-03-25 RX ORDER — ATORVASTATIN CALCIUM 80 MG/1
40 TABLET, FILM COATED ORAL AT BEDTIME
Qty: 0 | Refills: 0 | Status: DISCONTINUED | OUTPATIENT
Start: 2019-03-25 | End: 2019-03-26

## 2019-03-25 RX ORDER — METOPROLOL TARTRATE 50 MG
25 TABLET ORAL
Qty: 0 | Refills: 0 | Status: DISCONTINUED | OUTPATIENT
Start: 2019-03-25 | End: 2019-03-26

## 2019-03-25 RX ADMIN — Medication 650 MILLIGRAM(S): at 08:26

## 2019-03-25 RX ADMIN — TAMSULOSIN HYDROCHLORIDE 0.4 MILLIGRAM(S): 0.4 CAPSULE ORAL at 22:03

## 2019-03-25 RX ADMIN — Medication 25 MILLIGRAM(S): at 06:02

## 2019-03-25 RX ADMIN — Medication 25 MILLIGRAM(S): at 18:14

## 2019-03-25 RX ADMIN — Medication 650 MILLIGRAM(S): at 09:30

## 2019-03-25 RX ADMIN — LISINOPRIL 5 MILLIGRAM(S): 2.5 TABLET ORAL at 06:02

## 2019-03-25 RX ADMIN — Medication 3 MILLILITER(S): at 15:46

## 2019-03-25 RX ADMIN — Medication 2: at 13:14

## 2019-03-25 RX ADMIN — ATORVASTATIN CALCIUM 40 MILLIGRAM(S): 80 TABLET, FILM COATED ORAL at 22:03

## 2019-03-25 RX ADMIN — Medication 3 MILLILITER(S): at 20:36

## 2019-03-25 RX ADMIN — AZITHROMYCIN 500 MILLIGRAM(S): 500 TABLET, FILM COATED ORAL at 02:30

## 2019-03-25 RX ADMIN — ENOXAPARIN SODIUM 40 MILLIGRAM(S): 100 INJECTION SUBCUTANEOUS at 22:03

## 2019-03-25 RX ADMIN — Medication 75 MILLIGRAM(S): at 18:12

## 2019-03-25 RX ADMIN — Medication 75 MILLIGRAM(S): at 06:02

## 2019-03-25 RX ADMIN — AZITHROMYCIN 255 MILLIGRAM(S): 500 TABLET, FILM COATED ORAL at 01:30

## 2019-03-25 RX ADMIN — Medication 6: at 18:12

## 2019-03-25 RX ADMIN — Medication 3 MILLILITER(S): at 08:40

## 2019-03-25 NOTE — H&P ADULT - ASSESSMENT
74 yo male with hld, htn, bph, colonic mass s/p resection, admitted with Pneumonia and influenza A complicated by sepsis.     1) Chlamydia pneumonia and influenza A   ID consult  admit to any bed  chest x ray reviewed  RVP perfomed- +flu A, chlamydia pna  legionella urine antigen   nebs prn sob/wheezing  incentive spirometry  NC O2 prn sob, hypoxia with goal >92%  cbc, cmp, coags, mg, phos   IV antibiotics- levaquin  tamiflu started    2) Sepsis   Blood culture  U/A and culture   monitor lactate-> if >2 give additional bolus and f/u rpt s/p IVF  administer 30cc/kg bolus in increments of 500cc with evaluation of fluid status prior to subsequent bolus administration  monitor vitals closely  strict i/o's  CODE STATUS: Full code    3) Low TSH  repeat, pt acutely ill  consider starting synthroid     4) DMII  HISS  carb restricted diet  HbA1c    5) HTN  lisinopril 5 mg po qd   monitor renal function

## 2019-03-25 NOTE — H&P ADULT - NSICDXFAMILYHX_GEN_ALL_CORE_FT
FAMILY HISTORY:  No pertinent family history in first degree relatives, no known family history of colon or other cancers in mother or father. No known medical history of mother or father

## 2019-03-25 NOTE — H&P ADULT - PSYCHIATRIC COMMENTS
bizarre behavior, urinating on floor, answers questions appropriately, gait wnl, strength +5 throughout, no gross neurological deficits noted

## 2019-03-25 NOTE — H&P ADULT - NSICDXPASTMEDICALHX_GEN_ALL_CORE_FT
PAST MEDICAL HISTORY:  Colonic mass     Hyperlipidemia, unspecified hyperlipidemia type     Hypertension, unspecified type     Type 2 diabetes mellitus without complication, with long-term current use of insulin

## 2019-03-25 NOTE — H&P ADULT - HISTORY OF PRESENT ILLNESS
72 yo male with hld, htn, DMII, bph, abdominal mass with questionable outpt follow up who was brought in by EMS due to c/o several days of HA and weakness. History taken from EMS report, ED RN and partially from patient who is a poor historian (AAO to person/place/situation but not time). Pt at time of interview states he was weak with headache and cough and has pneumonia and then proceeded to get out of bed and place sheets on floor and urinate on them stating he has diabetes and cannot make it to the bathroom. As per ED RN patient has done this three times previously throughout night and has maintained mentation. Pt denies any symptoms at this time and states he feels better. Pt denies sick contacts, abdominal pain, diarrhea, fever, gait disturbance, visual or auditory changes, speech changes, numbness/tingling.   Of note, pt was diagnosed with colonic mass in 12/2018 and had Right ileocolonic resection with unclear bx results and follow up.     attempted to call family and no answer, emergency contact number provided went straight to voice mail.

## 2019-03-25 NOTE — CONSULT NOTE ADULT - SUBJECTIVE AND OBJECTIVE BOX
[facility]  INFECTIOUS DISEASES AND INTERNAL MEDICINE at [place]  =======================================================  Lio Medina MD  Diplomates American Board of Internal Medicine and Infectious Diseases  =======================================================    [id number]  JULIO TAPIA   HPI:  This 72 yo male with HTN, HLD, DM, BPH, abdominal mass in 12/2018, s/p Right ileocolonic resection, who was brought in by EMS due to c/o several days of HA and weakness. History taken from EMS report, ED RN and partially from patient who is a poor historian (AAO to person/place/situation but not time). Pt at time of interview states he was weak with headache and cough and has pneumonia and then proceeded to get out of bed and place sheets on floor and urinate on them stating he has diabetes and cannot make it to the bathroom. As per ED RN patient has done this three times previously throughout night and has maintained mentation. Pt denies any symptoms at this time and states he feels better. Pt denies sick contacts, abdominal pain, diarrhea, fever, gait disturbance, visual or auditory changes, speech changes, numbness/tingling.   Workup here significant for RVP positive for INFLUENZA and CHLAMYDIA PNEUMONIAE.     patient reports that he feels much better.   less cough.   still very weak with a lot of muscle aches.     Denies sick contacts. but wife was not feeling well some time ago.       =======================================================  Past Medical & Surgical Hx:  =====================  PAST MEDICAL & SURGICAL HISTORY:  Colonic mass  Hyperlipidemia, unspecified hyperlipidemia type  Hypertension, unspecified type  Type 2 diabetes mellitus without complication, with long-term current use of insulin  Other acute appendicitis      Problem List:  ==========  HEALTH ISSUES - PROBLEM Dx:    Social Hx:  =======  no toxic habits currently    FAMILY HISTORY:  No pertinent family history in first degree relatives: no known family history of colon or other cancers in mother or father. No known medical history of mother or father  no significant family history of immunosuppressive disorders in mother or father   =======================================================  REVIEW OF SYSTEMS:  as above  all other ROS negative  =======================================================  Allergies  Gluten (Anaphylaxis)  No Known Drug Allergies    Antibiotics:  levoFLOXacin IVPB 750 milliGRAM(s) IV Intermittent every 24 hours  oseltamivir 75 milliGRAM(s) Oral two times a day    Other medications:  ALBUTerol/ipratropium for Nebulization 3 milliLiter(s) Nebulizer every 6 hours  atorvastatin 40 milliGRAM(s) Oral at bedtime  dextrose 5%. 1000 milliLiter(s) IV Continuous <Continuous>  dextrose 50% Injectable 12.5 Gram(s) IV Push once  dextrose 50% Injectable 25 Gram(s) IV Push once  dextrose 50% Injectable 25 Gram(s) IV Push once  enoxaparin Injectable 40 milliGRAM(s) SubCutaneous every 24 hours  insulin lispro (HumaLOG) corrective regimen sliding scale   SubCutaneous three times a day before meals  lisinopril 5 milliGRAM(s) Oral daily  metoprolol tartrate 25 milliGRAM(s) Oral two times a day  tamsulosin 0.4 milliGRAM(s) Oral at bedtime     azithromycin  IVPB   255 mL/Hr IV Intermittent (03-25-19 @ 01:30)    cefTRIAXone   IVPB   100 mL/Hr IV Intermittent (03-24-19 @ 21:22)    levoFLOXacin IVPB   100 mL/Hr IV Intermittent (03-25-19 @ 06:02)    oseltamivir   75 milliGRAM(s) Oral (03-25-19 @ 06:02)   75 milliGRAM(s) Oral (03-25-19 @ 18:12)      ======================================================  Physical Exam:  ============  T(F): 98.7 (25 Mar 2019 08:20), Max: 101.2 (24 Mar 2019 20:39)  HR: 78 (25 Mar 2019 18:10)  BP: 155/73 (25 Mar 2019 18:10)  RR: 18 (25 Mar 2019 18:10)  SpO2: 93% (25 Mar 2019 18:10) (93% - 97%)  Weight (kg): 80 (03-24-19 @ 20:45)    General:  No acute distress.  Eye: Pupils are equal, round and reactive to light, Extraocular movements are intact, Normal conjunctiva.  HENT: Normocephalic, Oral mucosa is moist, No pharyngeal erythema, No sinus tenderness.  Neck: Supple, No lymphadenopathy.  Respiratory: Lungs with COARSE BREATH SOUNDS BILATERALLY.   Cardiovascular: Normal rate, Regular rhythm, Good pulses equal in all extremities, No edema.  Gastrointestinal: Soft, Non-tender, Non-distended, Normal bowel sounds.  Genitourinary: No costovertebral angle tenderness.  Lymphatics: No lymphadenopathy neck,   Musculoskeletal: Normal range of motion, Normal strength.  Integumentary: No rash. DAMP SKIN  Neurologic: Alert, Oriented, No focal deficits, Cranial Nerves II-XII are grossly intact.  Psychiatric: Appropriate mood & affect.    =======================================================  Labs:                        10.6   4.7   )-----------( 142      ( 25 Mar 2019 07:23 )             31.5     03-25    138  |  100  |  11.0  ----------------------------<  122<H>  3.8   |  24.0  |  0.80    Ca    8.9      25 Mar 2019 07:23  Phos  3.1     03-25  Mg     1.9     03-25    TPro  7.2  /  Alb  4.0  /  TBili  0.4  /  DBili  x   /  AST  18  /  ALT  25  /  AlkPhos  58  03-25

## 2019-03-25 NOTE — ED ADULT NURSE REASSESSMENT NOTE - NS ED NURSE REASSESS COMMENT FT1
Pt sleeping comfortably on stretcher.  No nonverbal indicators of pain present.  Respirations even and unlabored.  Awaiting bed placement.  VSS on cardiac monitor.  PIV wnl; flushing without difficulty.  In NAD, will continue to monitor.
Pt. found at bedside by MD Mane urinating on floor; pt. on assessment following incident found to be A&Ox4 but stating "I have to because of my diabetes"; at time of pt. urinating on floor, pt. noted to have a urinal within reach at bedside.  Pt. reeducated on safety measures and reoriented to room and use of call bell.  Additional urinals placed at bedside for ease of use.  Pt. verbalize understanding and teach back used regarding safety and call bell.
a+ox3, talking and laughing with daughter, denies any new complaints at this time. [place].s.s. continues with IVF hydration.
patient awake, alert. pt. denies pain or discomfort. medications given as documented. vss. as documented. awaiting bed. will continue to monitor.
pt denies any new complaints given urinal of ua. / c+s. will continue to evalpt.
pt. received from night RN. pt. a&ox3. pt. c/o headache, and sore throat. medications given as documented. awaiting bed. informed on plan of care. will continue to monitor.
report received from robert rn; chart as noted.  Pt sleeping comfortably on stretcher.  No nonverbal indicators of pain present.  Respirations even and unlabored.  Awaiting further orders.  PIV wnl; flushing without difficulty.  In NAD, will continue to monitor.
Report received from offgoing RN, charting as noted. Patient is resting comfortably in bed, normal sinus on monitor, in isolation room.  Patient's respirations are even and unlabored, no reports of pain.  Report given to 5T nurse, awaiting transport.

## 2019-03-25 NOTE — CONSULT NOTE ADULT - ASSESSMENT
This 73 y.o. man here for weakness, headache and cough    found with INFLUENZA A  Chlamydia PNEUMONIAE infection    Chest X ray shows "clear lungs" which may be seen in atypical PNA.     - Continue 5 day course of Tamiflu 75mg PO BID  - continue Levaquin for a 7 day course.     if remains stable overnight, can consider discharge to community tomorrow AM.         call back as needed.

## 2019-03-25 NOTE — PROGRESS NOTE ADULT - SUBJECTIVE AND OBJECTIVE BOX
HOSPITALIST PROGRESS NOTE    JULIO TAPIA  [id number]  73yMale    Patient is a 73y old  Male who presents with a chief complaint of headache, weakness (25 Mar 2019 19:15)      SUBJECTIVE:   Chart reviewed since last visit.  Patient seen and examined at bedside for influenza, chlamydia pneumonia and encephalopathy.  Feels better, dyspnea still present but less than yesterday.  Cough, scant clear phlegm.  Occasional chills, no chest pain or dizziness.        OBJECTIVE:  Vital Signs Last 24 Hrs  T(C): 37.1 (25 Mar 2019 08:20), Max: 37.9 (24 Mar 2019 21:14)  T(F): 98.7 (25 Mar 2019 08:20), Max: 100.2 (24 Mar 2019 21:14)  HR: 65 (25 Mar 2019 20:37) (65 - 92)  BP: 155/73 (25 Mar 2019 18:10) (135/61 - 162/79)   RR: 18 (25 Mar 2019 18:10) (18 - 23)  SpO2: 95% (25 Mar 2019 20:37) (93% - 97%)    PHYSICAL EXAMINATION  General: Elderly male lying in bed, NAD  HEENT:  AT NC pupils equal, responsive, reactive to light and accomodation, no nasal discharge  NECK:   supple  CVS: regular rate and rhythm S1 S2  RESP:  Fair air entry, mild rhonchi R>L  GI:  Soft nondistended nontender BS-  : No suprapubic tenderness  MS:  FROM, no edema  CNS:  No gross global or focal deficit appreciated  INTEG: Warm dry skin   PSYCH:  Oriented x 3, fair mood    MONITOR:  CAPILLARY BLOOD GLUCOSE      POCT Blood Glucose.: 252 mg/dL (25 Mar 2019 17:30)  POCT Blood Glucose.: 174 mg/dL (25 Mar 2019 12:46)  POCT Blood Glucose.: 203 mg/dL (25 Mar 2019 11:27)  POCT Blood Glucose.: 123 mg/dL (25 Mar 2019 08:26)  POCT Blood Glucose.: 166 mg/dL (25 Mar 2019 00:45)        I&O's Summary    24 Mar 2019 07:01  -  25 Mar 2019 07:00  --------------------------------------------------------  IN: 2000 mL / OUT: 0 mL / NET: 2000 mL                            10.6   4.7   )-----------( 142      ( 25 Mar 2019 07:23 )             31.5     PT/INR - ( 24 Mar 2019 21:13 )   PT: 12.8 sec;   INR: 1.11 ratio         PTT - ( 24 Mar 2019 21:13 )  PTT:25.4 sec      138  |  100  |  11.0  ----------------------------<  122<H>  3.8   |  24.0  |  0.80    Ca    8.9      25 Mar 2019 07:23  Phos  3.1       Mg     1.9         TPro  7.2  /  Alb  4.0  /  TBili  0.4  /  DBili  x   /  AST  18  /  ALT  25  /  AlkPhos  58      CARDIAC MARKERS ( 24 Mar 2019 21:13 )  x     / <0.01 ng/mL / 200 U/L / x     / 3.8 ng/mL      Urinalysis Basic - ( 24 Mar 2019 23:31 )    Color: Yellow / Appearance: Clear / S.005 / pH: x  Gluc: x / Ketone: Negative  / Bili: Negative / Urobili: Negative mg/dL   Blood: x / Protein: 30 mg/dL / Nitrite: Negative   Leuk Esterase: Negative / RBC: 3-5 /HPF / WBC 0-2   Sq Epi: x / Non Sq Epi: Occasional / Bacteria: Occasional        Culture:  Rapid Respiratory Viral Panel (19 @ 21:51)    Rapid RVP Result: Detected: TYPE:(C=Critical, N=Notification, A=Abnormal) C  TESTS: _INFLA, INFLAH3  DATE/TIME CALLED: _19 00:29  CALLED TO: RANDALL PICKERING  READ BACK (2 Patient Identifiers)(Y/N): _Y  READ BACK VALUES (Y/N):Y _  CALLED BY: _DANELLE PERAZA  This Respiratory Panel uses polymerase chain reaction (PCR) to detect for  adenovirus; coronavirus (HKU1, NL63, 229E, OC43); human metapneumovirus  (hMPV); human enterovirus/rhinovirus (Entero/RV); influenza A; influenza  A/H1; influenza A/H3; influenza A/H1-2009; influenza B; parainfluenza  viruses 1, 2, 3, 4; respiratory syncytial virus; Mycoplasma pneumoniae;  and Chlamydophila pneumoniae.    Influenza A (RapRVP): Detected: TYPE:(C=Critical, N=Notification, A=Abnormal) C  TESTS: _INFLA, INFLAH3  DATE/TIME CALLED: _19 00:29  CALLED TO: RANDALL PICKERING  READ BACK (2 Patient Identifiers)(Y/N): _Y  READ BACK VALUES (Y/N):Y _  CALLED BY: _DANELLE PERAZA    Influenza AH3 (RapRVP): Detected: TYPE:(C=Critical, N=Notification, A=Abnormal) C  TESTS: _INFLA, INFLAH3  DATE/TIME CALLED: _19 00:29  CALLED TO: RANDALL PICKERING  READ BACK (2 Patient Identifiers)(Y/N): _Y  READ BACK VALUES (Y/N):Y _  CALLED BY: _DANELLE PERAZA    Chlamydia pneumoniae (RapRVP): Detected: TYPE:(C=Critical, N=Notification, A=Abnormal) C  TESTS: _INFLA, INFLAH3  DATE/TIME CALLED: _19 00:29  CALLED TO: RANDALL PICKERING  READ BACK (2 Patient Identifiers)(Y/N): _Y  READ BACK VALUES (Y/N):Y _  CALLED BY: _DANELLE PERAZA      TTE:    RADIOLOGY  < from: CT Head No Cont (19 @ 20:53) >     EXAM:  CT BRAIN                          PROCEDURE DATE:  2019          INTERPRETATION:  Brain CT    Indication: Fever, altered mental status    Technique: Axial images were obtained, along with reformatted coronal and   sagittal images.    Comparison:  None    FINDINGS:    There is no intracranial hemorrhage, abnormal extra-axial fluid   collection, mass effect, midline shift or large acute cortical infarct.    Gray-white differentiation is maintained.  There are age appropriate   involutional changes with commensurate dilatation of the CSF spaces.    There are subcortical and periventricular white matter lucencies likely   representing microvascular ischemic disease.    The mastoid air cells and visualized paranasal sinuses are well aerated.  The patient is status post bilateral lens replacement for cataract   disease.    IMPRESSION:    No intracranial hemorrhage, mass effect or large acute cortical infarct.  No contraindication to lumbar puncture.                ALE EARLY M.D, ATTENDING RADIOLOGIST  This document has been electronically signed. Mar 24 2019  9:02PM                  < end of copied text >    < from: Xray Chest 1 View AP/PA. (19 @ 22:06) >     EXAM:  XR CHEST AP OR PA 1V                          PROCEDURE DATE:  2019          INTERPRETATION:  Clinical information: Cough.    Technique: Frontal view of the chest.    Comparison: Prior chest x-ray examination from 5/10/2010.    Lines:The lungs are clear. The cardiomediastinal silhouette is normal.   There are mild multilevel degenerative changes of the thoracic spine.    IMPRESSION: Clear lungs, unchanged.                MARY STEVENSON M.D., ATTENDING RADIOLOGIST  This document has been electronically signed. Mar 25 2019  8:37AM    < end of copied text >        MEDICATIONS  (STANDING):  ALBUTerol/ipratropium for Nebulization 3 milliLiter(s) Nebulizer every 6 hours  atorvastatin 40 milliGRAM(s) Oral at bedtime  dextrose 5%. 1000 milliLiter(s) (50 mL/Hr) IV Continuous <Continuous>  dextrose 50% Injectable 12.5 Gram(s) IV Push once  dextrose 50% Injectable 25 Gram(s) IV Push once  dextrose 50% Injectable 25 Gram(s) IV Push once  enoxaparin Injectable 40 milliGRAM(s) SubCutaneous every 24 hours  insulin lispro (HumaLOG) corrective regimen sliding scale   SubCutaneous three times a day before meals  levoFLOXacin IVPB 750 milliGRAM(s) IV Intermittent every 24 hours  lisinopril 5 milliGRAM(s) Oral daily  metoprolol tartrate 25 milliGRAM(s) Oral two times a day  oseltamivir 75 milliGRAM(s) Oral two times a day  tamsulosin 0.4 milliGRAM(s) Oral at bedtime      MEDICATIONS  (PRN):  acetaminophen   Tablet .. 650 milliGRAM(s) Oral every 6 hours PRN Temp greater or equal to 38C (100.4F), Mild Pain (1 - 3)  dextrose 40% Gel 15 Gram(s) Oral once PRN Blood Glucose LESS THAN 70 milliGRAM(s)/deciliter  glucagon  Injectable 1 milliGRAM(s) IntraMuscular once PRN Glucose LESS THAN 70 milligrams/deciliter

## 2019-03-25 NOTE — PROGRESS NOTE ADULT - ASSESSMENT
72 yo male with hld, htn, bph, colonic mass s/p resection, admitted with Chlamydia  Pneumonia and influenza A complicated by sepsis.   Interval improvement since admission.    Chlamydia pneumonia and influenza A - symptomatically better  - Supplemental O2 as needed  - Tamiflu  - Levaquin    Sepsis - resolving.  lactate normal  - continue treatment for underlying pneumonic infection    Metabolic encephalopathy, acute secondary to infection - resolved    Low TSH  - repeat, pt acutely ill, possible sick euthyroid      DMII  HISS  carb restricted diet  HbA1c    HTN  lisinopril 5 mg po qd   monitor renal function    BPH  - Continue Flomax    Prophylactic measure  - LMWH for VTE ppx    PT     If continues to improve, anticipate home in next 24-48 hours    Discussed with patient, daughter and ID

## 2019-03-26 ENCOUNTER — TRANSCRIPTION ENCOUNTER (OUTPATIENT)
Age: 74
End: 2019-03-26

## 2019-03-26 VITALS
TEMPERATURE: 98 F | HEART RATE: 72 BPM | RESPIRATION RATE: 18 BRPM | DIASTOLIC BLOOD PRESSURE: 70 MMHG | SYSTOLIC BLOOD PRESSURE: 145 MMHG | OXYGEN SATURATION: 93 %

## 2019-03-26 PROBLEM — K63.9 DISEASE OF INTESTINE, UNSPECIFIED: Chronic | Status: ACTIVE | Noted: 2019-03-25

## 2019-03-26 LAB
CULTURE RESULTS: NO GROWTH — SIGNIFICANT CHANGE UP
GLUCOSE BLDC GLUCOMTR-MCNC: 181 MG/DL — HIGH (ref 70–99)
GLUCOSE BLDC GLUCOMTR-MCNC: 190 MG/DL — HIGH (ref 70–99)
GLUCOSE BLDC GLUCOMTR-MCNC: 208 MG/DL — HIGH (ref 70–99)
HBA1C BLD-MCNC: 6.6 % — HIGH (ref 4–5.6)
SPECIMEN SOURCE: SIGNIFICANT CHANGE UP

## 2019-03-26 PROCEDURE — 94640 AIRWAY INHALATION TREATMENT: CPT

## 2019-03-26 PROCEDURE — 36415 COLL VENOUS BLD VENIPUNCTURE: CPT

## 2019-03-26 PROCEDURE — 87798 DETECT AGENT NOS DNA AMP: CPT

## 2019-03-26 PROCEDURE — 82553 CREATINE MB FRACTION: CPT

## 2019-03-26 PROCEDURE — 99285 EMERGENCY DEPT VISIT HI MDM: CPT | Mod: 25

## 2019-03-26 PROCEDURE — 99238 HOSP IP/OBS DSCHRG MGMT 30/<: CPT

## 2019-03-26 PROCEDURE — 87040 BLOOD CULTURE FOR BACTERIA: CPT

## 2019-03-26 PROCEDURE — 87449 NOS EACH ORGANISM AG IA: CPT

## 2019-03-26 PROCEDURE — 83036 HEMOGLOBIN GLYCOSYLATED A1C: CPT

## 2019-03-26 PROCEDURE — 84100 ASSAY OF PHOSPHORUS: CPT

## 2019-03-26 PROCEDURE — 82962 GLUCOSE BLOOD TEST: CPT

## 2019-03-26 PROCEDURE — 80053 COMPREHEN METABOLIC PANEL: CPT

## 2019-03-26 PROCEDURE — 83735 ASSAY OF MAGNESIUM: CPT

## 2019-03-26 PROCEDURE — 87581 M.PNEUMON DNA AMP PROBE: CPT

## 2019-03-26 PROCEDURE — 87633 RESP VIRUS 12-25 TARGETS: CPT

## 2019-03-26 PROCEDURE — 83690 ASSAY OF LIPASE: CPT

## 2019-03-26 PROCEDURE — 87086 URINE CULTURE/COLONY COUNT: CPT

## 2019-03-26 PROCEDURE — 84484 ASSAY OF TROPONIN QUANT: CPT

## 2019-03-26 PROCEDURE — 85027 COMPLETE CBC AUTOMATED: CPT

## 2019-03-26 PROCEDURE — 82550 ASSAY OF CK (CPK): CPT

## 2019-03-26 PROCEDURE — 93005 ELECTROCARDIOGRAM TRACING: CPT

## 2019-03-26 PROCEDURE — 85730 THROMBOPLASTIN TIME PARTIAL: CPT

## 2019-03-26 PROCEDURE — 96365 THER/PROPH/DIAG IV INF INIT: CPT

## 2019-03-26 PROCEDURE — 71045 X-RAY EXAM CHEST 1 VIEW: CPT

## 2019-03-26 PROCEDURE — 87486 CHLMYD PNEUM DNA AMP PROBE: CPT

## 2019-03-26 PROCEDURE — 83605 ASSAY OF LACTIC ACID: CPT

## 2019-03-26 PROCEDURE — 84443 ASSAY THYROID STIM HORMONE: CPT

## 2019-03-26 PROCEDURE — 85610 PROTHROMBIN TIME: CPT

## 2019-03-26 PROCEDURE — 96375 TX/PRO/DX INJ NEW DRUG ADDON: CPT

## 2019-03-26 PROCEDURE — 70450 CT HEAD/BRAIN W/O DYE: CPT

## 2019-03-26 PROCEDURE — 81001 URINALYSIS AUTO W/SCOPE: CPT

## 2019-03-26 RX ADMIN — Medication 4: at 12:38

## 2019-03-26 RX ADMIN — Medication 75 MILLIGRAM(S): at 17:13

## 2019-03-26 RX ADMIN — Medication 25 MILLIGRAM(S): at 17:13

## 2019-03-26 RX ADMIN — LISINOPRIL 5 MILLIGRAM(S): 2.5 TABLET ORAL at 06:23

## 2019-03-26 RX ADMIN — Medication 100 MILLIGRAM(S): at 17:13

## 2019-03-26 RX ADMIN — Medication 75 MILLIGRAM(S): at 06:23

## 2019-03-26 RX ADMIN — Medication 2: at 17:13

## 2019-03-26 RX ADMIN — Medication 3 MILLILITER(S): at 15:13

## 2019-03-26 RX ADMIN — Medication 25 MILLIGRAM(S): at 06:23

## 2019-03-26 RX ADMIN — Medication 2: at 08:05

## 2019-03-26 RX ADMIN — Medication 3 MILLILITER(S): at 09:07

## 2019-03-26 RX ADMIN — Medication 3 MILLILITER(S): at 03:26

## 2019-03-26 NOTE — PHARMACOTHERAPY INTERVENTION NOTE - COMMENTS
Patient on levaquin 750mg IV qd for chlamydia pneumonia. Recommend switching to oral doxycycline as it is tolerated better in elderly patients. Levaquin changed to doxycyline oral 100mg bid.

## 2019-03-26 NOTE — DISCHARGE NOTE PROVIDER - HOSPITAL COURSE
72 yo male with hld, htn, bph, colonic mass s/p resection, admitted with Chlamydia  Pneumonia and influenza A complicated by sepsis.     Interval improvement since admission.        Chlamydia pneumonia and influenza A - symptomatically better    No longer needs supplemental oxygen or nebs.    Continued on Tamiflu to complete 5 day course, Levaquin changed to Doxycycline to complete 7 day course        Sepsis - resolved        Metabolic encephalopathy, acute secondary to infection - resolved        DMII - placed on sliding scale Insulins. A1c 6.6         HTN - continued on Lisinopril        BPH - continued Flomax            Patient clinically much improved and is medically stable for discharge.    New prescriptions sent to [facility] called discussed with MAMADOU Pride

## 2019-03-26 NOTE — DISCHARGE NOTE NURSING/CASE MANAGEMENT/SOCIAL WORK - NSDCDPATPORTLINK_GEN_ALL_CORE
You can access the [facility] Patient Portal, offered by [facility], by registering with the following website: [email or web address]

## 2019-03-26 NOTE — DISCHARGE NOTE PROVIDER - NSDCACTIVITY_GEN_ALL_CORE
Walking - Outdoors allowed/Walking - Indoors allowed/Bathing allowed/Stairs allowed/Showering allowed

## 2019-03-26 NOTE — DISCHARGE NOTE NURSING/CASE MANAGEMENT/SOCIAL WORK - NSDCFUADDAPPT_GEN_ALL_CORE_FT
A DISCHARGE FOLLOW UP APPOINTMENT HAS BEEN SCHEDULED FOR YOU WITH  Physician: JenPulmonologist  Address: [address] [place] [address]  Phone Number: [phone]    Date and Time of appointment: Friday 4/5/19 at 11:00

## 2019-03-26 NOTE — DISCHARGE NOTE PROVIDER - NSDCPNSUBOBJ_GEN_ALL_CORE
HOSPITALIST PROGRESS NOTE        JULIO TAPIA    [id number]    73yMale        Patient is a 73y old  Male who presents with a chief complaint of headache, weakness (25 Mar 2019 21:09)            SUBJECTIVE:     Chart reviewed since last visit.    Patient seen and examined at bedside for influenza and chlamydia pneumonia.    till with dyspnea though improved - denies any dizziness, chills or chest pain.    Cough with scant phlegm            OBJECTIVE:    Vital Signs Last 24 Hrs    T(C): 36.8 (26 Mar 2019 15:47), Max: 37.5 (25 Mar 2019 21:50)    T(F): 98.2 (26 Mar 2019 15:47), Max: 99.5 (25 Mar 2019 21:50)    HR: 72 (26 Mar 2019 15:47) (64 - 78)    BP: 145/70 (26 Mar 2019 15:47) (111/64 - 160/76)     RR: 18 (26 Mar 2019 15:47) (18 - 21)    SpO2: 93% (26 Mar 2019 15:47) (93% - 98%)                MONITOR:    CAPILLARY BLOOD GLUCOSE            POCT Blood Glucose.: 208 mg/dL (26 Mar 2019 12:21)    POCT Blood Glucose.: 190 mg/dL (26 Mar 2019 07:53)    POCT Blood Glucose.: 173 mg/dL (25 Mar 2019 23:39)    POCT Blood Glucose.: 252 mg/dL (25 Mar 2019 17:30)                I&O's Summary                                10.6     4.7   )-----------( 142      ( 25 Mar 2019 07:23 )               31.5         PT/INR - ( 24 Mar 2019 21:13 )   PT: 12.8 sec;   INR: 1.11 ratio               PTT - ( 24 Mar 2019 21:13 )  PTT:25.4 sec    -        138  |  100  |  11.0    ----------------------------<  122<H>    3.8   |  24.0  |  0.80        Ca    8.9      25 Mar 2019 07:23    Phos  3.1     03-25    Mg     1.9     -25        TPro  7.2  /  Alb  4.0  /  TBili  0.4  /  DBili  x   /  AST  18  /  ALT  25  /  AlkPhos  58  03-25        CARDIAC MARKERS ( 24 Mar 2019 21:13 )    x     / <0.01 ng/mL / 200 U/L / x     / 3.8 ng/mL            Urinalysis Basic - ( 24 Mar 2019 23:31 )        Color: Yellow / Appearance: Clear / S.005 / pH: x    Gluc: x / Ketone: Negative  / Bili: Negative / Urobili: Negative mg/dL     Blood: x / Protein: 30 mg/dL / Nitrite: Negative     Leuk Esterase: Negative / RBC: 3-5 /HPF / WBC 0-2     Sq Epi: x / Non Sq Epi: Occasional / Bacteria: Occasional                Culture:        TTE:        RADIOLOGY                MEDICATIONS  (STANDING):    ALBUTerol/ipratropium for Nebulization 3 milliLiter(s) Nebulizer every 6 hours    atorvastatin 40 milliGRAM(s) Oral at bedtime    dextrose 5%. 1000 milliLiter(s) (50 mL/Hr) IV Continuous <Continuous>    dextrose 50% Injectable 12.5 Gram(s) IV Push once    dextrose 50% Injectable 25 Gram(s) IV Push once    dextrose 50% Injectable 25 Gram(s) IV Push once    doxycycline hyclate Capsule 100 milliGRAM(s) Oral every 12 hours    enoxaparin Injectable 40 milliGRAM(s) SubCutaneous every 24 hours    insulin lispro (HumaLOG) corrective regimen sliding scale   SubCutaneous three times a day before meals    lisinopril 5 milliGRAM(s) Oral daily    metoprolol tartrate 25 milliGRAM(s) Oral two times a day    oseltamivir 75 milliGRAM(s) Oral two times a day    tamsulosin 0.4 milliGRAM(s) Oral at bedtime            MEDICATIONS  (PRN):    acetaminophen   Tablet .. 650 milliGRAM(s) Oral every 6 hours PRN Temp greater or equal to 38C (100.4F), Mild Pain (1 - 3)    dextrose 40% Gel 15 Gram(s) Oral once PRN Blood Glucose LESS THAN 70 milliGRAM(s)/deciliter    glucagon  Injectable 1 milliGRAM(s) IntraMuscular once PRN Glucose LESS THAN 70 milligrams/deciliter

## 2019-03-26 NOTE — DISCHARGE NOTE PROVIDER - NSDCCPCAREPLAN_GEN_ALL_CORE_FT
PRINCIPAL DISCHARGE DIAGNOSIS  Diagnosis: Pneumonia due to Chlamydia  Assessment and Plan of Treatment: complete antibiotic course  Follow up with PMD      SECONDARY DISCHARGE DIAGNOSES  Diagnosis: Type 2 diabetes mellitus  Assessment and Plan of Treatment: continue diet and medications as prescribed.    Diagnosis: Acute encephalopathy  Assessment and Plan of Treatment: resolved    Diagnosis: Influenza with pneumonia  Assessment and Plan of Treatment: complete Tamiflu  Follow up with PMD

## 2019-03-26 NOTE — DISCHARGE NOTE PROVIDER - CARE PROVIDER_API CALL
Hoang In Armando RUBIO)  Medicine  [address] [place] [address]  Phone: [phone]  Fax: [phone]  Follow Up Time: 1-3 days

## 2019-03-27 LAB — LEGIONELLA AG UR QL: NEGATIVE — SIGNIFICANT CHANGE UP

## 2019-03-29 LAB
CULTURE RESULTS: SIGNIFICANT CHANGE UP
CULTURE RESULTS: SIGNIFICANT CHANGE UP
SPECIMEN SOURCE: SIGNIFICANT CHANGE UP
SPECIMEN SOURCE: SIGNIFICANT CHANGE UP

## 2019-04-05 ENCOUNTER — APPOINTMENT (OUTPATIENT)
Dept: PULMONOLOGY | Facility: CLINIC | Age: 74
End: 2019-04-05
Payer: MEDICARE

## 2019-04-05 VITALS
HEIGHT: 69 IN | SYSTOLIC BLOOD PRESSURE: 122 MMHG | RESPIRATION RATE: 14 BRPM | DIASTOLIC BLOOD PRESSURE: 78 MMHG | HEART RATE: 68 BPM | OXYGEN SATURATION: 98 % | BODY MASS INDEX: 30.51 KG/M2 | WEIGHT: 206 LBS

## 2019-04-05 DIAGNOSIS — E66.9 OBESITY, UNSPECIFIED: ICD-10-CM

## 2019-04-05 DIAGNOSIS — Z86.39 PERSONAL HISTORY OF OTHER ENDOCRINE, NUTRITIONAL AND METABOLIC DISEASE: ICD-10-CM

## 2019-04-05 DIAGNOSIS — Z87.09 PERSONAL HISTORY OF OTHER DISEASES OF THE RESPIRATORY SYSTEM: ICD-10-CM

## 2019-04-05 DIAGNOSIS — R09.82 POSTNASAL DRIP: ICD-10-CM

## 2019-04-05 DIAGNOSIS — Z87.891 PERSONAL HISTORY OF NICOTINE DEPENDENCE: ICD-10-CM

## 2019-04-05 DIAGNOSIS — Z78.9 OTHER SPECIFIED HEALTH STATUS: ICD-10-CM

## 2019-04-05 DIAGNOSIS — R06.02 SHORTNESS OF BREATH: ICD-10-CM

## 2019-04-05 DIAGNOSIS — R05 COUGH: ICD-10-CM

## 2019-04-05 DIAGNOSIS — Z00.00 ENCOUNTER FOR GENERAL ADULT MEDICAL EXAMINATION W/OUT ABNORMAL FINDINGS: ICD-10-CM

## 2019-04-05 DIAGNOSIS — Z86.79 PERSONAL HISTORY OF OTHER DISEASES OF THE CIRCULATORY SYSTEM: ICD-10-CM

## 2019-04-05 PROCEDURE — 94727 GAS DIL/WSHOT DETER LNG VOL: CPT

## 2019-04-05 PROCEDURE — 94729 DIFFUSING CAPACITY: CPT

## 2019-04-05 PROCEDURE — 94010 BREATHING CAPACITY TEST: CPT

## 2019-04-05 PROCEDURE — 99495 TRANSJ CARE MGMT MOD F2F 14D: CPT | Mod: 25

## 2019-04-05 PROCEDURE — 85018 HEMOGLOBIN: CPT | Mod: QW

## 2019-04-05 RX ORDER — ASPIRIN 81 MG
81 TABLET,CHEWABLE ORAL
Refills: 0 | Status: ACTIVE | COMMUNITY

## 2019-04-05 RX ORDER — LORATADINE 10 MG/1
10 TABLET ORAL
Refills: 0 | Status: ACTIVE | COMMUNITY

## 2019-04-05 RX ORDER — CARVEDILOL 6.25 MG/1
6.25 TABLET, FILM COATED ORAL
Refills: 0 | Status: ACTIVE | COMMUNITY

## 2019-04-05 RX ORDER — TAMSULOSIN HYDROCHLORIDE 0.4 MG/1
0.4 CAPSULE ORAL
Refills: 0 | Status: ACTIVE | COMMUNITY

## 2019-04-05 RX ORDER — METOPROLOL SUCCINATE 25 MG/1
25 TABLET, EXTENDED RELEASE ORAL
Refills: 0 | Status: ACTIVE | COMMUNITY

## 2019-04-05 RX ORDER — LINAGLIPTIN 5 MG/1
5 TABLET, FILM COATED ORAL
Refills: 0 | Status: ACTIVE | COMMUNITY

## 2019-04-05 RX ORDER — LISINOPRIL 2.5 MG/1
2.5 TABLET ORAL
Refills: 0 | Status: ACTIVE | COMMUNITY

## 2019-04-05 RX ORDER — METFORMIN HYDROCHLORIDE 1000 MG/1
1000 TABLET, COATED ORAL
Refills: 0 | Status: ACTIVE | COMMUNITY

## 2019-04-05 RX ORDER — SIMVASTATIN 40 MG/1
40 TABLET, FILM COATED ORAL
Refills: 0 | Status: ACTIVE | COMMUNITY

## 2019-04-05 RX ORDER — ALBUTEROL SULFATE 90 UG/1
108 (90 BASE) AEROSOL, METERED RESPIRATORY (INHALATION)
Qty: 1 | Refills: 3 | Status: ACTIVE | COMMUNITY
Start: 2019-04-05 | End: 1900-01-01

## 2019-04-05 RX ORDER — DOCUSATE SODIUM 100 MG/1
100 CAPSULE, LIQUID FILLED ORAL
Refills: 0 | Status: ACTIVE | COMMUNITY

## 2019-04-05 RX ORDER — GLIMEPIRIDE 2 MG/1
2 TABLET ORAL
Refills: 0 | Status: ACTIVE | COMMUNITY

## 2019-04-05 NOTE — CONSULT LETTER
[Dear  ___] : Dear  [unfilled], [Consult Letter:] : I had the pleasure of evaluating your patient, [unfilled]. [Please see my note below.] : Please see my note below. [Consult Closing:] : Thank you very much for allowing me to participate in the care of this patient.  If you have any questions, please do not hesitate to contact me. [Sincerely,] : Sincerely, [FreeTextEntry3] : Matthew Taylor MD, FCCP, ARTURO. ABSM\par

## 2019-04-05 NOTE — REVIEW OF SYSTEMS
[Postnasal Drip] : postnasal drip [Cough] : cough [Hypertension] : ~T hypertension [Back Pain] : ~T back pain [Diabetes] : diabetes mellitus [Fever] : no fever [Chills] : no chills [Dry Eyes] : no dryness of the eyes [Eye Irritation] : no ~T irritation of the eyes [Nasal Congestion] : no nasal congestion [Epistaxis] : no nosebleeds [Sinus Problems] : no sinus problems [Sputum] : not coughing up ~M sputum [Dyspnea] : no dyspnea [Chest Tightness] : no chest tightness [Pleuritic Pain] : no pleuritic pain [Wheezing] : no wheezing [Chest Discomfort] : no chest discomfort [Dysrhythmia] : no dysrhythmia [Murmurs] : no murmurs were heard [Palpitations] : no palpitations [Edema] : ~T edema was not present [Hay Fever] : no hay fever [Itchy Eyes] : no itching of ~T the eyes [Reflux] : no reflux [Nausea] : no nausea [Vomiting] : no vomiting [Constipation] : no constipation [Diarrhea] : no diarrhea [Abdominal Pain] : no abdominal pain [Dysuria] : no dysuria [Trauma] : no ~T physical trauma [Fracture] : no fracture [Anemia] : no anemia [Headache] : no headache [Dizziness] : no dizziness [Syncope] : no fainting [Numbness] : no numbness [Paralysis] : no paralysis was seen [Seizures] : no seizures [Depression] : no depression [Anxiety] : no anxiety [Thyroid Problem] : no thyroid problem [Snoring] : no snoring [Witnessed Apneas] : demonstrated no ~M apnea [Nonrestorative Sleep] : restorative sleep

## 2019-04-05 NOTE — DISCUSSION/SUMMARY
[FreeTextEntry1] : \par #1. PFTs performed today are essentially normal.\par #2. The patient does not appear to require chronic BD therapy at this time\par #3. Diet and exercise for weight loss\par #4. SOBOE is likely related to weight or deconditioning given normal PFTs\par #5. CXR was clear \par #6. Flonase and Claritin for PNDS as needed\par #7. ProAir as needed for SOB but without obstruction on PFTs\par #8. F/u in 6 months with repeat spirometry \par \par Discussed above with patient and daughter who was also present.

## 2019-04-05 NOTE — HISTORY OF PRESENT ILLNESS
[Initial Evaluation] : an initial evaluation of [Excess Weight] : excess weight [Currently Experiencing] : The patient is currently experiencing symptoms. [Dyspnea] : dyspnea [Back Pain] : back pain [Low Calorie Diet] : low calorie diet [Fair Compliance] : fair compliance with treatment [Fair Tolerance] : fair tolerance of treatment [Poor Symptom Control] : poor symptom control [Dyslipidemia] : dyslipidemia [Diabetes] : diabetes [Hypertension] : hypertension [High] : high [Low Calorie] : low calorie [Well Balanced Diet] : well balanced meals [None] : The patient does not exercise [FreeTextEntry1] : The patient was recently hospitalized for cough, SOB and fevers. He was found to have two strains of influenza and was treated with antimicrobial therapy. He is now feeling much better and is near baseline.\par Takes Claritin for PNDS; rarely uses Flonase

## 2019-06-05 ENCOUNTER — TRANSCRIPTION ENCOUNTER (OUTPATIENT)
Age: 74
End: 2019-06-05

## 2019-06-25 NOTE — ED PROVIDER NOTE - NEURO NEGATIVE STATEMENT, MLM
no loss of consciousness, no gait abnormality, no headache, no sensory deficits, and no weakness. Breath Sounds equal & clear to percussion & auscultation, no accessory muscle use

## 2019-08-16 ENCOUNTER — OUTPATIENT (OUTPATIENT)
Dept: OUTPATIENT SERVICES | Facility: HOSPITAL | Age: 74
LOS: 1 days | End: 2019-08-16
Payer: MEDICARE

## 2019-08-16 VITALS
TEMPERATURE: 97 F | DIASTOLIC BLOOD PRESSURE: 75 MMHG | WEIGHT: 209 LBS | HEIGHT: 71 IN | HEART RATE: 68 BPM | SYSTOLIC BLOOD PRESSURE: 153 MMHG | RESPIRATION RATE: 97 BRPM

## 2019-08-16 DIAGNOSIS — Z01.810 ENCOUNTER FOR PREPROCEDURAL CARDIOVASCULAR EXAMINATION: ICD-10-CM

## 2019-08-16 DIAGNOSIS — K35.890 OTHER ACUTE APPENDICITIS WITHOUT PERFORATION OR GANGRENE: Chronic | ICD-10-CM

## 2019-08-16 LAB
ANION GAP SERPL CALC-SCNC: 12 MMOL/L — SIGNIFICANT CHANGE UP (ref 5–17)
APTT BLD: 26.3 SEC — LOW (ref 27.5–36.3)
BASOPHILS # BLD AUTO: 0.03 K/UL — SIGNIFICANT CHANGE UP (ref 0–0.2)
BASOPHILS NFR BLD AUTO: 0.5 % — SIGNIFICANT CHANGE UP (ref 0–2)
BUN SERPL-MCNC: 23 MG/DL — HIGH (ref 8–20)
CALCIUM SERPL-MCNC: 9.5 MG/DL — SIGNIFICANT CHANGE UP (ref 8.6–10.2)
CHLORIDE SERPL-SCNC: 101 MMOL/L — SIGNIFICANT CHANGE UP (ref 98–107)
CO2 SERPL-SCNC: 25 MMOL/L — SIGNIFICANT CHANGE UP (ref 22–29)
CREAT SERPL-MCNC: 0.98 MG/DL — SIGNIFICANT CHANGE UP (ref 0.5–1.3)
EOSINOPHIL # BLD AUTO: 0.3 K/UL — SIGNIFICANT CHANGE UP (ref 0–0.5)
EOSINOPHIL NFR BLD AUTO: 5 % — SIGNIFICANT CHANGE UP (ref 0–6)
GLUCOSE SERPL-MCNC: 179 MG/DL — HIGH (ref 70–115)
HCT VFR BLD CALC: 34.1 % — LOW (ref 39–50)
HGB BLD-MCNC: 11.3 G/DL — LOW (ref 13–17)
IMM GRANULOCYTES NFR BLD AUTO: 0.5 % — SIGNIFICANT CHANGE UP (ref 0–1.5)
INR BLD: 0.93 RATIO — SIGNIFICANT CHANGE UP (ref 0.88–1.16)
LYMPHOCYTES # BLD AUTO: 1.72 K/UL — SIGNIFICANT CHANGE UP (ref 1–3.3)
LYMPHOCYTES # BLD AUTO: 28.7 % — SIGNIFICANT CHANGE UP (ref 13–44)
MCHC RBC-ENTMCNC: 31.3 PG — SIGNIFICANT CHANGE UP (ref 27–34)
MCHC RBC-ENTMCNC: 33.1 GM/DL — SIGNIFICANT CHANGE UP (ref 32–36)
MCV RBC AUTO: 94.5 FL — SIGNIFICANT CHANGE UP (ref 80–100)
MONOCYTES # BLD AUTO: 0.44 K/UL — SIGNIFICANT CHANGE UP (ref 0–0.9)
MONOCYTES NFR BLD AUTO: 7.3 % — SIGNIFICANT CHANGE UP (ref 2–14)
NEUTROPHILS # BLD AUTO: 3.48 K/UL — SIGNIFICANT CHANGE UP (ref 1.8–7.4)
NEUTROPHILS NFR BLD AUTO: 58 % — SIGNIFICANT CHANGE UP (ref 43–77)
PLATELET # BLD AUTO: 199 K/UL — SIGNIFICANT CHANGE UP (ref 150–400)
POTASSIUM SERPL-MCNC: 4.3 MMOL/L — SIGNIFICANT CHANGE UP (ref 3.5–5.3)
POTASSIUM SERPL-SCNC: 4.3 MMOL/L — SIGNIFICANT CHANGE UP (ref 3.5–5.3)
PROTHROM AB SERPL-ACNC: 10.7 SEC — SIGNIFICANT CHANGE UP (ref 10–12.9)
RBC # BLD: 3.61 M/UL — LOW (ref 4.2–5.8)
RBC # FLD: 12.7 % — SIGNIFICANT CHANGE UP (ref 10.3–14.5)
SODIUM SERPL-SCNC: 138 MMOL/L — SIGNIFICANT CHANGE UP (ref 135–145)
WBC # BLD: 6 K/UL — SIGNIFICANT CHANGE UP (ref 3.8–10.5)
WBC # FLD AUTO: 6 K/UL — SIGNIFICANT CHANGE UP (ref 3.8–10.5)

## 2019-08-16 PROCEDURE — G0463: CPT

## 2019-08-16 PROCEDURE — 80048 BASIC METABOLIC PNL TOTAL CA: CPT

## 2019-08-16 PROCEDURE — 93010 ELECTROCARDIOGRAM REPORT: CPT

## 2019-08-16 PROCEDURE — 85730 THROMBOPLASTIN TIME PARTIAL: CPT

## 2019-08-16 PROCEDURE — 93005 ELECTROCARDIOGRAM TRACING: CPT

## 2019-08-16 PROCEDURE — 85610 PROTHROMBIN TIME: CPT

## 2019-08-16 PROCEDURE — 36415 COLL VENOUS BLD VENIPUNCTURE: CPT

## 2019-08-16 PROCEDURE — 85027 COMPLETE CBC AUTOMATED: CPT

## 2019-08-16 RX ORDER — ATORVASTATIN CALCIUM 80 MG/1
1 TABLET, FILM COATED ORAL
Qty: 0 | Refills: 0 | DISCHARGE

## 2019-08-16 RX ORDER — METFORMIN HYDROCHLORIDE 850 MG/1
2 TABLET ORAL
Qty: 0 | Refills: 0 | DISCHARGE

## 2019-08-16 NOTE — H&P PST ADULT - HISTORY OF PRESENT ILLNESS
This is a 75 yo male who has a history of diabetes, HTN, HLD. he had a stress test aprox 1 year ago which was negative for ischemia.  though due to multiple cardiac risk factors and complaints of progressive KNAPP and chest tightness he is scheduled for a cardiac catheterization.   LAst TTE 3/19 revealed normal LV of 55% and no valvular disease.    Carotid US 3/19 mild carotid stenosis.     BRA 1.4  Mallampati 2   ASA 2   GFR 79  Creat .98

## 2019-08-16 NOTE — H&P PST ADULT - ASSESSMENT
Plan: PRE-PROCEDURE ASSESSMENT    -Patient seen and examined  -Labs reviewed  -Pre-procedure teaching completed with patient   -Questions answered about patients concerns     -instructed to NPO after midnight.   - Pt instructed to have escort to and from procedure   -pt instructed IF STENT PLACED WILL REQUIRE TO STAY OVERNIGHT FOR MONITORING AND DISCHARGED IN THE AM .   -Hold metformin tuesday and Wednesday

## 2019-10-21 ENCOUNTER — OUTPATIENT (OUTPATIENT)
Dept: OUTPATIENT SERVICES | Facility: HOSPITAL | Age: 74
LOS: 1 days | End: 2019-10-21
Payer: MEDICARE

## 2019-10-21 VITALS
WEIGHT: 210.1 LBS | HEART RATE: 63 BPM | DIASTOLIC BLOOD PRESSURE: 68 MMHG | HEIGHT: 71 IN | RESPIRATION RATE: 18 BRPM | TEMPERATURE: 98 F | OXYGEN SATURATION: 100 % | SYSTOLIC BLOOD PRESSURE: 151 MMHG

## 2019-10-21 DIAGNOSIS — K35.890 OTHER ACUTE APPENDICITIS WITHOUT PERFORATION OR GANGRENE: Chronic | ICD-10-CM

## 2019-10-21 DIAGNOSIS — Z98.890 OTHER SPECIFIED POSTPROCEDURAL STATES: Chronic | ICD-10-CM

## 2019-10-21 DIAGNOSIS — Z01.818 ENCOUNTER FOR OTHER PREPROCEDURAL EXAMINATION: ICD-10-CM

## 2019-10-21 LAB
ANION GAP SERPL CALC-SCNC: 12 MMOL/L — SIGNIFICANT CHANGE UP (ref 5–17)
APTT BLD: 26.8 SEC — LOW (ref 27.5–36.3)
BASOPHILS # BLD AUTO: 0.03 K/UL — SIGNIFICANT CHANGE UP (ref 0–0.2)
BASOPHILS NFR BLD AUTO: 0.6 % — SIGNIFICANT CHANGE UP (ref 0–2)
BUN SERPL-MCNC: 19 MG/DL — SIGNIFICANT CHANGE UP (ref 8–20)
CALCIUM SERPL-MCNC: 9.5 MG/DL — SIGNIFICANT CHANGE UP (ref 8.6–10.2)
CHLORIDE SERPL-SCNC: 102 MMOL/L — SIGNIFICANT CHANGE UP (ref 98–107)
CHOLEST SERPL-MCNC: 78 MG/DL — LOW (ref 110–199)
CO2 SERPL-SCNC: 24 MMOL/L — SIGNIFICANT CHANGE UP (ref 22–29)
CREAT SERPL-MCNC: 0.81 MG/DL — SIGNIFICANT CHANGE UP (ref 0.5–1.3)
EOSINOPHIL # BLD AUTO: 0.26 K/UL — SIGNIFICANT CHANGE UP (ref 0–0.5)
EOSINOPHIL NFR BLD AUTO: 4.8 % — SIGNIFICANT CHANGE UP (ref 0–6)
GLUCOSE SERPL-MCNC: 249 MG/DL — HIGH (ref 70–115)
HCT VFR BLD CALC: 33.4 % — LOW (ref 39–50)
HDLC SERPL-MCNC: 25 MG/DL — LOW
HGB BLD-MCNC: 11.3 G/DL — LOW (ref 13–17)
IMM GRANULOCYTES NFR BLD AUTO: 0.2 % — SIGNIFICANT CHANGE UP (ref 0–1.5)
INR BLD: 0.99 RATIO — SIGNIFICANT CHANGE UP (ref 0.88–1.16)
LIPID PNL WITH DIRECT LDL SERPL: 29 MG/DL — SIGNIFICANT CHANGE UP
LYMPHOCYTES # BLD AUTO: 1.7 K/UL — SIGNIFICANT CHANGE UP (ref 1–3.3)
LYMPHOCYTES # BLD AUTO: 31.2 % — SIGNIFICANT CHANGE UP (ref 13–44)
MAGNESIUM SERPL-MCNC: 1.9 MG/DL — SIGNIFICANT CHANGE UP (ref 1.6–2.6)
MCHC RBC-ENTMCNC: 31 PG — SIGNIFICANT CHANGE UP (ref 27–34)
MCHC RBC-ENTMCNC: 33.8 GM/DL — SIGNIFICANT CHANGE UP (ref 32–36)
MCV RBC AUTO: 91.8 FL — SIGNIFICANT CHANGE UP (ref 80–100)
MONOCYTES # BLD AUTO: 0.46 K/UL — SIGNIFICANT CHANGE UP (ref 0–0.9)
MONOCYTES NFR BLD AUTO: 8.4 % — SIGNIFICANT CHANGE UP (ref 2–14)
NEUTROPHILS # BLD AUTO: 2.99 K/UL — SIGNIFICANT CHANGE UP (ref 1.8–7.4)
NEUTROPHILS NFR BLD AUTO: 54.8 % — SIGNIFICANT CHANGE UP (ref 43–77)
PLATELET # BLD AUTO: 184 K/UL — SIGNIFICANT CHANGE UP (ref 150–400)
POTASSIUM SERPL-MCNC: 4.5 MMOL/L — SIGNIFICANT CHANGE UP (ref 3.5–5.3)
POTASSIUM SERPL-SCNC: 4.5 MMOL/L — SIGNIFICANT CHANGE UP (ref 3.5–5.3)
PROTHROM AB SERPL-ACNC: 11.4 SEC — SIGNIFICANT CHANGE UP (ref 10–12.9)
RBC # BLD: 3.64 M/UL — LOW (ref 4.2–5.8)
RBC # FLD: 12.4 % — SIGNIFICANT CHANGE UP (ref 10.3–14.5)
SODIUM SERPL-SCNC: 138 MMOL/L — SIGNIFICANT CHANGE UP (ref 135–145)
TOTAL CHOLESTEROL/HDL RATIO MEASUREMENT: 3 RATIO — LOW (ref 3.4–9.6)
TRIGL SERPL-MCNC: 122 MG/DL — SIGNIFICANT CHANGE UP (ref 10–200)
WBC # BLD: 5.45 K/UL — SIGNIFICANT CHANGE UP (ref 3.8–10.5)
WBC # FLD AUTO: 5.45 K/UL — SIGNIFICANT CHANGE UP (ref 3.8–10.5)

## 2019-10-21 PROCEDURE — G0463: CPT

## 2019-10-21 PROCEDURE — 36415 COLL VENOUS BLD VENIPUNCTURE: CPT

## 2019-10-21 PROCEDURE — 83735 ASSAY OF MAGNESIUM: CPT

## 2019-10-21 PROCEDURE — 80048 BASIC METABOLIC PNL TOTAL CA: CPT

## 2019-10-21 PROCEDURE — 93005 ELECTROCARDIOGRAM TRACING: CPT

## 2019-10-21 PROCEDURE — 85610 PROTHROMBIN TIME: CPT

## 2019-10-21 PROCEDURE — 80061 LIPID PANEL: CPT

## 2019-10-21 PROCEDURE — 93010 ELECTROCARDIOGRAM REPORT: CPT

## 2019-10-21 PROCEDURE — 85027 COMPLETE CBC AUTOMATED: CPT

## 2019-10-21 PROCEDURE — 85730 THROMBOPLASTIN TIME PARTIAL: CPT

## 2019-10-21 RX ORDER — SIMVASTATIN 20 MG/1
1 TABLET, FILM COATED ORAL
Qty: 0 | Refills: 0 | DISCHARGE

## 2019-10-21 RX ORDER — LORATADINE 10 MG/1
1 TABLET ORAL
Qty: 0 | Refills: 0 | DISCHARGE

## 2019-10-21 RX ORDER — CARVEDILOL PHOSPHATE 80 MG/1
1 CAPSULE, EXTENDED RELEASE ORAL
Qty: 0 | Refills: 0 | DISCHARGE

## 2019-10-21 RX ORDER — FUROSEMIDE 40 MG
1 TABLET ORAL
Qty: 0 | Refills: 0 | DISCHARGE

## 2019-10-21 NOTE — ASU PATIENT PROFILE, ADULT - PSH
H/O right inguinal hernia repair    History of colon surgery  mass removed  Other acute appendicitis

## 2019-10-21 NOTE — H&P PST ADULT - NSICDXPASTSURGICALHX_GEN_ALL_CORE_FT
PAST SURGICAL HISTORY:  H/O right inguinal hernia repair     History of colon surgery mass removed    Other acute appendicitis

## 2019-10-21 NOTE — ASU PATIENT PROFILE, ADULT - FALLEN IN THE PAST
Problem: Patient Care Overview  Goal: Plan of Care/Patient Progress Review  Outcome: No Change  A&O x4. AVSS. Pt destaing when sleeping 2L NC applied. Pt c/o abdominal pain. Pain managed with Advil x1 and Iv dilaudid x1. Tolerating regular diet.  and 185. NPO at MN. LR infusing at 100ml/hr. Iv abx infused per order. Abdominal dressing  ABD reinforced. Crystal with adequate UOP. +Flatus. No BM this shift. Plan for OR tomorrow. Continue POC       no

## 2019-10-21 NOTE — H&P PST ADULT - HISTORY OF PRESENT ILLNESS
72 yo male with hld, htn, DMII, bph, abdominal mass with questionable outpt follow up who was brought in by EMS due to c/o several days of HA and weakness. History taken from EMS report, ED RN and partially from patient who is a poor historian (AAO to person/place/situation but not time). Pt at time of interview states he was weak with headache and cough and has pneumonia and then proceeded to get out of bed and place sheets on floor and urinate on them stating he has diabetes and cannot make it to the bathroom. As per ED RN patient has done this three times previously throughout night and has maintained mentation. Pt denies any symptoms at this time and states he feels better. Pt denies sick contacts, abdominal pain, diarrhea, fever, gait disturbance, visual or auditory changes, speech changes, numbness/tingling.   Of note, pt was diagnosed with colonic mass in 12/2018 and had Right ileocolonic resection with unclear bx results and follow up. 73 yo male with hld, htn, DMII, bph, 73 yo male with hld, htn, DMII, bph, with abnormal ekg he has c/o chest tightness randomly. He presents for PST for LHC. Currently denies chest pain, sob, palps.     10/2019: Negative stress test for ECG evidence of ischemia. Perfusion imaging reveals a small area of mild ischemia involving the inferior wall. Diaghragmatic attentuation artifcact is present involving the inferior wall.     ECHO: 3/2019: EF 65-70%. mILD AR. Mild to moderate aortic valve sclerosis/calcifcation without any evidence of aortic stenosis.    BRA: 1.6%

## 2019-10-21 NOTE — H&P PST ADULT - ASSESSMENT
73 yo male with chest pain for LHC  -Procedure explained in lenght with patient  -Hold metformin monday, tues wed  -Hold glimeperide and trajenta day of procedure

## 2019-10-21 NOTE — ASU PATIENT PROFILE, ADULT - PMH
Colonic mass    Hyperlipidemia, unspecified hyperlipidemia type    Hypertension, unspecified type    Type 2 diabetes mellitus without complication, with long-term current use of insulin

## 2019-10-23 ENCOUNTER — OUTPATIENT (OUTPATIENT)
Dept: OUTPATIENT SERVICES | Facility: HOSPITAL | Age: 74
LOS: 1 days | Discharge: HOME | End: 2019-10-23
Payer: MEDICARE

## 2019-10-23 ENCOUNTER — TRANSCRIPTION ENCOUNTER (OUTPATIENT)
Age: 74
End: 2019-10-23

## 2019-10-23 VITALS
DIASTOLIC BLOOD PRESSURE: 68 MMHG | RESPIRATION RATE: 16 BRPM | TEMPERATURE: 98 F | HEART RATE: 61 BPM | SYSTOLIC BLOOD PRESSURE: 161 MMHG | OXYGEN SATURATION: 98 %

## 2019-10-23 VITALS
RESPIRATION RATE: 16 BRPM | OXYGEN SATURATION: 96 % | DIASTOLIC BLOOD PRESSURE: 68 MMHG | HEART RATE: 60 BPM | SYSTOLIC BLOOD PRESSURE: 148 MMHG

## 2019-10-23 DIAGNOSIS — R94.39 ABNORMAL RESULT OF OTHER CARDIOVASCULAR FUNCTION STUDY: ICD-10-CM

## 2019-10-23 DIAGNOSIS — Z98.890 OTHER SPECIFIED POSTPROCEDURAL STATES: Chronic | ICD-10-CM

## 2019-10-23 DIAGNOSIS — K35.890 OTHER ACUTE APPENDICITIS WITHOUT PERFORATION OR GANGRENE: Chronic | ICD-10-CM

## 2019-10-23 LAB — GLUCOSE BLDC GLUCOMTR-MCNC: 160 MG/DL — HIGH (ref 70–99)

## 2019-10-23 PROCEDURE — 99152 MOD SED SAME PHYS/QHP 5/>YRS: CPT

## 2019-10-23 PROCEDURE — C1887: CPT

## 2019-10-23 PROCEDURE — 93458 L HRT ARTERY/VENTRICLE ANGIO: CPT

## 2019-10-23 PROCEDURE — C1894: CPT

## 2019-10-23 PROCEDURE — 99153 MOD SED SAME PHYS/QHP EA: CPT

## 2019-10-23 PROCEDURE — 82962 GLUCOSE BLOOD TEST: CPT

## 2019-10-23 PROCEDURE — C1769: CPT

## 2019-10-23 RX ORDER — METFORMIN HYDROCHLORIDE 850 MG/1
1 TABLET ORAL
Qty: 0 | Refills: 0 | DISCHARGE

## 2019-10-23 RX ORDER — ASPIRIN/CALCIUM CARB/MAGNESIUM 324 MG
1 TABLET ORAL
Qty: 0 | Refills: 0 | DISCHARGE

## 2019-10-23 RX ORDER — METOPROLOL TARTRATE 50 MG
1 TABLET ORAL
Qty: 0 | Refills: 0 | DISCHARGE

## 2019-10-23 RX ORDER — LISINOPRIL 2.5 MG/1
1 TABLET ORAL
Qty: 0 | Refills: 0 | DISCHARGE

## 2019-10-23 RX ORDER — ATORVASTATIN CALCIUM 80 MG/1
1 TABLET, FILM COATED ORAL
Qty: 0 | Refills: 0 | DISCHARGE

## 2019-10-23 RX ORDER — ASPIRIN/CALCIUM CARB/MAGNESIUM 325 MG
0 TABLET ORAL
Qty: 0 | Refills: 0 | DISCHARGE

## 2019-10-23 RX ORDER — TAMSULOSIN HYDROCHLORIDE 0.4 MG/1
1 CAPSULE ORAL
Qty: 0 | Refills: 0 | DISCHARGE

## 2019-10-23 RX ORDER — GLIMEPIRIDE 1 MG
1 TABLET ORAL
Qty: 0 | Refills: 0 | DISCHARGE

## 2019-10-23 RX ORDER — LORATADINE 10 MG/1
1 TABLET ORAL
Qty: 0 | Refills: 0 | DISCHARGE

## 2019-10-23 RX ORDER — LINAGLIPTIN 5 MG/1
1 TABLET, FILM COATED ORAL
Qty: 0 | Refills: 0 | DISCHARGE

## 2019-10-23 NOTE — PROGRESS NOTE ADULT - SUBJECTIVE AND OBJECTIVE BOX
Department of Cardiology                                                                  [facility]/[place] [address] [address]                                                            Telephone: [phone]. Fax:[phone]                                                                                 Cardiac Cath NP Note      Narrative:  73 y/o  M with PMHx HTN, HLD, DM II, abnormal ECG presents today for [facility] with Dr. Jewell.  He presented originally for a cardiac cath with Dr. Hope in 8/2019, however, he follows with Dr. Main and his cath was cancelled due to additional testing.  Pt is presenting now today due to intermittent chest pain; no provocations or alleviators identified. He is reporting substernal chest pain on the right side of his chest.   Of note, pt was being treated for chlamydia pneumonia and the flu back in March of 2019 (treated with tamiflu, levaquin, doxy, inhalers) which she believes precipitated his chest pain complaints. Since then, he has had some difficulty breathing as well and "environmental allergies" which he has never really had prior to that admission.    ECHO: 3/2019: EF 65-70%. mILD AR. Mild to moderate aortic valve sclerosis/calcifcation without any evidence of aortic stenosis.  Echo prior (12/2018) revealed a normal LV fxn with an EF of 60-65%    10/2019: Negative stress test for ECG evidence of ischemia. Perfusion imaging reveals a small area of mild ischemia involving the inferior wall. Diaghragmatic attentuation artifcact is present involving the inferior wall.     His chest pain remains vague and intermittent; pending LHC.        ASA: 2   Mallampati: 2  Bleeding Risk: 1.6%  Creatinine: 0.21  GFR: 86    PHYSICAL EXAM:  Constitutional: A & O x 3  HEENT:   Normal oral mucosa, PERRL, EOMI	  Cardiovascular: Normal S1 S2, No JVD, No murmurs, No edema  Respiratory: Lungs clear to auscultation	  Gastrointestinal:  Soft, Non-tender, + BS	  Skin: No rashes, No ecchymoses, No cyanosis  Neurologic: Non-focal  Extremities: Normal range of motion, No clubbing, cyanosis or edema  Vascular: Peripheral pulses palpable 2+ bilaterally      DIAGNOSTIC TESTING:  [ ] Echocardiogram  < from: TTE Echo Complete w/Doppler (12.02.18 @ 08:50) >  PHYSICIAN INTERPRETATION:  Left Ventricle: Normal left ventricular size and wall thicknesses, with   normal systolic function. The left ventricular internal cavity size is   normal. Left ventricular wall thickness is normal.  Left ventricular ejection fraction, by visual estimation, is 60 to 65%.   Spectral Doppler shows impaired relaxation pattern of left ventricular   myocardial filling (Grade I diastolic dysfunction).  Right Ventricle: Normal right ventricular size and function. The right   ventricular size is normal.  Left Atrium: Normal left atrial size.  Right Atrium: The right atrium is normal in size.  Pericardium: There is no evidence of pericardial effusion.  Mitral Valve: Structurally normal mitral valve, with normal leaflet   excursion. The mitral valve is normal in structure. Trace mitral valve   regurgitation is seen.  Tricuspid Valve: Structurally normal tricuspid valve, with normal leaflet   excursion. The tricuspid valve is normal. Mild tricuspid regurgitation is   visualized.  Aortic Valve: The aortic valve is trileaflet. Sclerotic aortic valve with   decreased opening. Peak Av velocity is 2.52 m/s, mean transaortic   gradient equals 12.7 mmHg, the calculated aortic valve area equals 1.96   cm² by the continuity equation consistent with mild aortic stenosis. Mild   to moderate aortic valve regurgitation is seen.  Pulmonic Valve: Structurally normal pulmonic valve, with normal leaflet   excursion. The pulmonic valve is normal. Mild pulmonic valve   regurgitation.  Aorta: The aortic root is normal in size and structure.  Pulmonary Artery: The main pulmonary artery is normal in size.  Venous: Not visualized.       Summary:   1. Left ventricular ejection fraction, by visual estimation, is 60 to   65%.   2. Spectral Doppler shows impaired relaxation pattern of left   ventricular myocardial filling (Grade I diastolic dysfunction).   3. Mild tricuspid regurgitation.   4. Mild to moderate aortic regurgitation.   5. Sclerotic aortic valve with decreased opening.   6. Pulmonic valve regurgitation.   7. Peak transaortic gradient is 25.3 mmHg, mean transaortic gradient   equals 12.7 mmHg, the calculated aortic valve area equals 1.96 cm² by the   continuity equation consistent with mild aortic stenosis.      LABS:	 	                          11.3   5.45  )-----------( 184      ( 21 Oct 2019 14:31 )             33.4     10-21    138  |  102  |  19.0  ----------------------------<  249<H>  4.5   |  24.0  |  0.81    Ca    9.5      21 Oct 2019 14:31  Mg     1.9     10-21          ASSESSMENT:73 y/o  M PMHx HTN, HLD, DM; here for LHC due to intermitten chest pain      -consent  obtained  -procedure discussed with patient; risks and benefits explained, questions answered  -labs ECG reviewed

## 2019-10-23 NOTE — DISCHARGE NOTE NURSING/CASE MANAGEMENT/SOCIAL WORK - NSDCPNDISPN_GEN_ALL_CORE
Side effects of pain management treatment/Education provided on the pain management plan of care/Opioids not applicable/not prescribed

## 2019-10-23 NOTE — DISCHARGE NOTE PROVIDER - HOSPITAL COURSE
73 yo male with hld, htn, DMII, bph, with abnormal ekg he has c/o chest tightness randomly. He presents for PST for LHC. Currently denies chest pain, sob, palps.         10/2019: Negative stress test for ECG evidence of ischemia. Perfusion imaging reveals a small area of mild ischemia involving the inferior wall. Diaghragmatic attentuation artifcact is present involving the inferior wall.         ECHO: 3/2019: EF 65-70%. mILD AR. Mild to moderate aortic valve sclerosis/calcifcation without any evidence of aortic stenosis.        Physical Exam:    · Constitutional    Well-developed, well nourished    · Eyes    EOMI; PERRL; no drainage or redness    · ENMT    No oral lesions; no gross abnormalities    · Respiratory    Breath Sounds equal & clear to percussion & auscultation, no accessory muscle use    · Cardiovascular    Regular rate & rhythm, normal S1, S2; no murmurs, gallops or rubs; no S3, S4    · Extremities    No cyanosis, clubbing or edema    · Neurological    Alert & oriented; no sensory, motor or coordination deficits, normal reflexes    · Psychiatric    detailed exam    T(C): 36.8 (10-23-19 @ 13:44), Max: 36.8 (10-23-19 @ 13:44)    HR: 61 (10-23-19 @ 13:44) (61 - 61)    BP: 161/68 (10-23-19 @ 13:44) (161/68 - 161/68)    RR: 16 (10-23-19 @ 13:44) (16 - 16)    SpO2: 98% (10-23-19 @ 13:44) (98% - 98%)    S/P Cath    Non obstructive CAD    Plan medical management    F/U with Dr Main as outpatient in 7-10 days

## 2019-10-23 NOTE — DISCHARGE NOTE NURSING/CASE MANAGEMENT/SOCIAL WORK - PATIENT PORTAL LINK FT
You can access the FollowMyHealth Patient Portal offered by [facility] by registering at the following website: [email or web address]. By joining [facility]’s FollowMyHealth portal, you will also be able to view your health information using other applications (apps) compatible with our system.

## 2019-10-23 NOTE — DISCHARGE NOTE PROVIDER - CARE PROVIDER_API CALL
Ally Main)  Cardiovascular Disease; Internal Medicine  [address], [address]  [place] [address]  Phone: [phone]  Fax: [phone]  Follow Up Time: 2 weeks

## 2019-10-23 NOTE — DISCHARGE NOTE PROVIDER - NSDCACTIVITY_GEN_ALL_CORE
Walking - Outdoors allowed/Showering allowed/Do not make important decisions/Walking - Indoors allowed/Stairs allowed/Sex allowed/Do not drive or operate machinery/Return to Work/School allowed

## 2019-10-25 ENCOUNTER — APPOINTMENT (OUTPATIENT)
Dept: PULMONOLOGY | Facility: CLINIC | Age: 74
End: 2019-10-25

## 2020-02-25 ENCOUNTER — EMERGENCY (EMERGENCY)
Facility: HOSPITAL | Age: 75
LOS: 1 days | Discharge: HOME | End: 2020-02-25
Attending: EMERGENCY MEDICINE
Payer: MEDICARE

## 2020-02-25 VITALS
OXYGEN SATURATION: 99 % | HEIGHT: 71 IN | RESPIRATION RATE: 18 BRPM | SYSTOLIC BLOOD PRESSURE: 103 MMHG | DIASTOLIC BLOOD PRESSURE: 50 MMHG | TEMPERATURE: 97 F | WEIGHT: 201.06 LBS | HEART RATE: 80 BPM

## 2020-02-25 VITALS
TEMPERATURE: 99 F | DIASTOLIC BLOOD PRESSURE: 61 MMHG | RESPIRATION RATE: 20 BRPM | OXYGEN SATURATION: 95 % | SYSTOLIC BLOOD PRESSURE: 117 MMHG | HEART RATE: 82 BPM

## 2020-02-25 DIAGNOSIS — K35.890 OTHER ACUTE APPENDICITIS WITHOUT PERFORATION OR GANGRENE: Chronic | ICD-10-CM

## 2020-02-25 DIAGNOSIS — Z98.890 OTHER SPECIFIED POSTPROCEDURAL STATES: Chronic | ICD-10-CM

## 2020-02-25 LAB
ALBUMIN SERPL ELPH-MCNC: 4.4 G/DL — SIGNIFICANT CHANGE UP (ref 3.3–5.2)
ALP SERPL-CCNC: 63 U/L — SIGNIFICANT CHANGE UP (ref 40–120)
ALT FLD-CCNC: 17 U/L — SIGNIFICANT CHANGE UP
ANION GAP SERPL CALC-SCNC: 15 MMOL/L — SIGNIFICANT CHANGE UP (ref 5–17)
APPEARANCE UR: ABNORMAL
AST SERPL-CCNC: 20 U/L — SIGNIFICANT CHANGE UP
BACTERIA # UR AUTO: ABNORMAL
BILIRUB SERPL-MCNC: 0.3 MG/DL — LOW (ref 0.4–2)
BILIRUB UR-MCNC: ABNORMAL
BUN SERPL-MCNC: 24 MG/DL — HIGH (ref 8–20)
CALCIUM SERPL-MCNC: 9.6 MG/DL — SIGNIFICANT CHANGE UP (ref 8.6–10.2)
CHLORIDE SERPL-SCNC: 102 MMOL/L — SIGNIFICANT CHANGE UP (ref 98–107)
CO2 SERPL-SCNC: 22 MMOL/L — SIGNIFICANT CHANGE UP (ref 22–29)
COLOR SPEC: YELLOW — SIGNIFICANT CHANGE UP
CREAT SERPL-MCNC: 1.57 MG/DL — HIGH (ref 0.5–1.3)
DIFF PNL FLD: ABNORMAL
EPI CELLS # UR: SIGNIFICANT CHANGE UP
GLUCOSE SERPL-MCNC: 170 MG/DL — HIGH (ref 70–99)
GLUCOSE UR QL: 50 MG/DL
HCT VFR BLD CALC: 34.6 % — LOW (ref 39–50)
HGB BLD-MCNC: 11.3 G/DL — LOW (ref 13–17)
HYALINE CASTS # UR AUTO: ABNORMAL /LPF
KETONES UR-MCNC: ABNORMAL
LEUKOCYTE ESTERASE UR-ACNC: ABNORMAL
LIDOCAIN IGE QN: 45 U/L — SIGNIFICANT CHANGE UP (ref 22–51)
MCHC RBC-ENTMCNC: 30.9 PG — SIGNIFICANT CHANGE UP (ref 27–34)
MCHC RBC-ENTMCNC: 32.7 GM/DL — SIGNIFICANT CHANGE UP (ref 32–36)
MCV RBC AUTO: 94.5 FL — SIGNIFICANT CHANGE UP (ref 80–100)
NITRITE UR-MCNC: NEGATIVE — SIGNIFICANT CHANGE UP
PH UR: 5 — SIGNIFICANT CHANGE UP (ref 5–8)
PLATELET # BLD AUTO: 211 K/UL — SIGNIFICANT CHANGE UP (ref 150–400)
POTASSIUM SERPL-MCNC: 4.3 MMOL/L — SIGNIFICANT CHANGE UP (ref 3.5–5.3)
POTASSIUM SERPL-SCNC: 4.3 MMOL/L — SIGNIFICANT CHANGE UP (ref 3.5–5.3)
PROT SERPL-MCNC: 7.3 G/DL — SIGNIFICANT CHANGE UP (ref 6.6–8.7)
PROT UR-MCNC: 500 MG/DL
RBC # BLD: 3.66 M/UL — LOW (ref 4.2–5.8)
RBC # FLD: 13.2 % — SIGNIFICANT CHANGE UP (ref 10.3–14.5)
RBC CASTS # UR COMP ASSIST: ABNORMAL /HPF (ref 0–4)
SODIUM SERPL-SCNC: 139 MMOL/L — SIGNIFICANT CHANGE UP (ref 135–145)
SP GR SPEC: 1.02 — SIGNIFICANT CHANGE UP (ref 1.01–1.02)
UROBILINOGEN FLD QL: 1 MG/DL
WBC # BLD: 8.17 K/UL — SIGNIFICANT CHANGE UP (ref 3.8–10.5)
WBC # FLD AUTO: 8.17 K/UL — SIGNIFICANT CHANGE UP (ref 3.8–10.5)
WBC UR QL: SIGNIFICANT CHANGE UP

## 2020-02-25 PROCEDURE — 74176 CT ABD & PELVIS W/O CONTRAST: CPT | Mod: 26

## 2020-02-25 PROCEDURE — 80053 COMPREHEN METABOLIC PANEL: CPT

## 2020-02-25 PROCEDURE — 85027 COMPLETE CBC AUTOMATED: CPT

## 2020-02-25 PROCEDURE — 99284 EMERGENCY DEPT VISIT MOD MDM: CPT

## 2020-02-25 PROCEDURE — 83690 ASSAY OF LIPASE: CPT

## 2020-02-25 PROCEDURE — 81001 URINALYSIS AUTO W/SCOPE: CPT

## 2020-02-25 PROCEDURE — 99284 EMERGENCY DEPT VISIT MOD MDM: CPT | Mod: 25

## 2020-02-25 PROCEDURE — 87086 URINE CULTURE/COLONY COUNT: CPT

## 2020-02-25 PROCEDURE — 36415 COLL VENOUS BLD VENIPUNCTURE: CPT

## 2020-02-25 PROCEDURE — 74176 CT ABD & PELVIS W/O CONTRAST: CPT

## 2020-02-25 RX ORDER — SODIUM CHLORIDE 9 MG/ML
1000 INJECTION INTRAMUSCULAR; INTRAVENOUS; SUBCUTANEOUS ONCE
Refills: 0 | Status: COMPLETED | OUTPATIENT
Start: 2020-02-25 | End: 2020-02-25

## 2020-02-25 RX ADMIN — SODIUM CHLORIDE 1000 MILLILITER(S): 9 INJECTION INTRAMUSCULAR; INTRAVENOUS; SUBCUTANEOUS at 14:43

## 2020-02-25 NOTE — ED STATDOCS - PATIENT PORTAL LINK FT
You can access the FollowMyHealth Patient Portal offered by [facility] by registering at the following website: [email or web address]. By joining [facility]’s FollowMyHealth portal, you will also be able to view your health information using other applications (apps) compatible with our system.

## 2020-02-25 NOTE — ED STATDOCS - CLINICAL SUMMARY MEDICAL DECISION MAKING FREE TEXT BOX
Evaluate for obstruction v. appendicitis; sugars under control, no DKA; will CT abdomen/pelvis, check labs, give labs; pt able to tolerate PO.

## 2020-02-25 NOTE — ED STATDOCS - CARE PROVIDER_API CALL
Rafi Modi)  Gastroenterology; Internal Medicine  [address] [place] [address]  Phone: [phone]  Fax: [phone]  Follow Up Time:

## 2020-02-25 NOTE — ED STATDOCS - OBJECTIVE STATEMENT
74 year old M pt with past medical history significant for DM, HLD, HTN, hernia repair in 2018, colon mass removal presents to the ED for evaluation of abdominal pain and diarrhea that has been ongoing for 4 days. Pt states that his appetite has been normal for 4 days, and he has been able to tolerate PO, but he has had intermittent episodes of abdominal pain and diarrhea soon after eating anything. Pt was seen by his PMD today and sent to the ED for imaging and further evaluation. FS was 108 at his pmd's office. Pt has been on a weekly long-term insulin shot for about 4 weeks. Denies nausea, vomiting, bloody stool, hematuria, dysuria. No further complaints at this time.

## 2020-02-25 NOTE — ED STATDOCS - PROGRESS NOTE DETAILS
PA NOTE: Pt seen by intake physician and hpi/ROS/PE/plan reviewed and agreed with.    PE: GEN: Awake, alert,  NAD,  EYES: PERRL CARDIAC: Reg rate and rhythm, S1,S2, RRR  RESP: No distress noted. Lungs CTA bilaterally no wheeze, ronchi, rales. ABD: soft,  non-tender, no guarding. . NEURO: AOx3, no focal deficits   PLAN: PT with stable VS, no acute distress, non toxic appearing, tolerating PO in the ED, pt with no acute findings, resolution of symptoms after conservative medical intervention, pt will be dc home with follow up to PCP, supportive care, educated about when to return to the ED if needed. PT verbalizes that he understands all instructions and results.

## 2020-02-25 NOTE — ED STATDOCS - NS_ ATTENDINGSCRIBEDETAILS _ED_A_ED_FT
I, Rafael Villasenor, performed the initial face to face bedside interview with this patient regarding history of present illness, review of symptoms and relevant past medical, social and family history.  I completed an independent physical examination.  I was the initial provider who evaluated this patient. I have signed out the follow up of any pending tests (i.e. labs, radiological studies) to the ACP.  I have communicated the patient’s plan of care and disposition with the ACP.  The history, relevant review of systems, past medical and surgical history, medical decision making, and physical examination was documented by the scribe in my presence and I attest to the accuracy of the documentation.

## 2020-02-25 NOTE — ED STATDOCS - GASTROINTESTINAL, MLM
abdomen soft, right lower quadrant tenderness to palpation, and mildly distended. Bowel sounds present.

## 2020-02-25 NOTE — ED STATDOCS - ADDITIONAL NOTES AND INSTRUCTIONS:
PT was evaluated At [facility] ED and was found to have a condition that warranted time of to rest and heal from WORK/SCHOOL.   Tyree Gaston PA-C

## 2020-02-25 NOTE — ED STATDOCS - NSFOLLOWUPINSTRUCTIONS_ED_ALL_ED_FT
Patient education: Diarrhea in adolescents and adults (The Basics)  View in Chinese  Written by the doctors and editors at [facility]  What is diarrhea?  Diarrhea describes bowel movements that are runny or watery, and happen 3 or more times in a day. Diarrhea is very common. Most adolescents and adults have diarrhea about 4 times a year. Just about everyone has it at some point.    What causes diarrhea?  Diarrhea can be caused by:    ?Viruses    ?Bacteria that live in food or water    ?Parasites, such as tiny worms that you can catch in some countries    ?Side effects from some medicines    ?Problems digesting certain types of food    ?Diseases that harm the digestive system (figure 1)    Is there anything I can do on my own to get better?  Yes. Here are some things you can try at home:    ?Drink a lot of liquids that have water, salt, and sugar. Good choices are water mixed with juice, flavored soda, and soup broth. If you are drinking enough fluids, your urine will be light yellow or almost clear.    ?Try to eat a little food. Good choices are potatoes, noodles, rice, oatmeal, crackers, bananas, soup, and boiled vegetables. Salty foods also help.    Should I see a doctor or nurse?  See your doctor or nurse if:    ?You have more than 6 runny bowel movements in 24 hours    ?You have blood in your bowel movements    ?You have a fever higher than 101.3ºF (38.5ºC) that does not go away after a day    ?You have severe belly pain    ?You are 70 or older    ?Your body has lost too much water. This is called "dehydration." Signs include:    •Lots of diarrhea that is very watery    •Feeling very tired    •Thirst    •Dry mouth or tongue    •Muscle cramps    •Dizziness    •Confusion    •Urine that is very yellow, or not needing to urinate for more than 5 hours    Will I need tests?  Many people do not need to have tests. But it's possible that your doctor will do tests to check if you are dehydrated or to figure out what is causing your diarrhea. Your doctor might do:    ?Blood tests    ?Tests on a sample of your bowel movements    How is diarrhea treated?  That depends on what is causing your diarrhea. You might not need any treatment. If you do, your doctor might recommend:    ?Fluids through an "IV" – An IV is a thin tube that goes into your vein. People with a lot of diarrhea might need IV fluids to treat or prevent dehydration.    ?Stopping some of your medicines    ?Changing the foods you eat    ?Antibiotics – These medicines treat bacterial infections. Most people do not need antibiotics, even if they have a bacterial infection. If you are very sick with fever and blood in your bowel movements, your doctor might prescribe antibiotics to help you get better faster.    ?Medicines that ease diarrhea – These medicines include loperamide (brand name: Imodium), diphenoxylate-atropine (brand name: Lomotil), and bismuth subsalicylate (brand names: Pepto-Bismol, Kaopectate). You should not take loperamide or diphenoxylate-atropine if you have a fever or blood in your bowel movements. Also, taking too much loperamide has led to serious heart problems in some people. If you have health problems or already take other medicines, talk to your doctor or nurse before trying loperamide. For all of these medicines, it's important to not take more than the label tells you to.    Can diarrhea be prevented?  You can reduce your chances of getting and spreading diarrhea by:    ?Washing your hands after changing diapers, cooking, eating, going to the bathroom, taking out the trash, touching animals, and blowing your nose.    ?Staying home from work or school until you feel better.    ?Paying attention to food safety. Tips include:    •Not drinking unpasteurized milk or foods made with it    •Washing fruits and vegetables well before eating them    •Keeping the refrigerator colder than 40ºF and the freezer below 0ºF    •Cooking meat and seafood until well done    •Cooking eggs until the yolk is firm    •Washing hands, knives, and cutting boards after they touch raw food    Patient education: Severe abdominal pain (The Basics)  View in Chinese  Written by the doctors and editors at [facility]  Are there different types of abdominal pain?  Yes. "Abdominal pain" means pain in the abdomen (or belly), which is the part of the body between the chest and the genital area. This pain can happen for different reasons. It can be "chronic," which means it develops over time, or "acute," which means it starts suddenly. It can be mild or severe. A person might feel the pain all over their abdomen, or only in 1 part.    Abdominal pain can feel different for different people. It can feel sharp or crampy, or dull and steady. Some people feel better if they curl into a ball, while others need to lie flat and completely still. People often feel sick to their stomach and retch or vomit.    Doctors use the term "acute abdomen" to describe an episode of severe abdominal pain that starts suddenly and lasts for a few hours or longer. It can cause pain so severe that the person has a hard time moving or breathing and it makes them want to go to the hospital or see their doctor or nurse right away. A true acute abdomen is a medical emergency.    What causes abdominal pain?  Lots of different things can cause abdominal pain. When pain is less severe, it can be due to something like a virus or a stomach inflammation (called "gastritis").    Acute pain that is more severe can be caused by problems with 1 or more organs in the abdomen. Organs in the abdomen can be part of the digestive, urinary, or reproductive systems (figure 1 and figure 2 and figure 3).    Conditions that affect organs in the chest or genital area can also cause pain. Even though these organs aren't in the abdomen, people might still have abdominal pain.    Common causes of acute abdominal pain in adults include:    ?Appendicitis – Appendicitis is the term for when the appendix (a long, thin pouch that hangs down from the large intestine) gets infected and inflamed.    ?Diverticulitis – Diverticulitis is an infection that develops in small pouches that can form in the intestine. This is common in older people.    ?Gallstones – Gallstones are small stones that form inside an organ called the gallbladder, which stores bile, a fluid that helps the body break down fat.    ?Abscess – An abscess is a collection of pus. An abscess can form in the abdomen, typically near the intestine.    ?Kidney stones – Kidney stones can form when salts and minerals that are normally in the urine build up and harden. They can cause pain when they pass through the ureters, which are the tubes that carry urine from the kidney to the bladder.    ?Bowel perforation – This is a hole in the bowel wall.    ?Perforated ulcer – This is a hole in the wall of the stomach or intestine.    ?Pancreatitis – This is the term for when the pancreas gets inflamed.    ?Ruptured cyst in the ovary – Cysts in the ovary are fluid-filled sacs that can form in some women. They sometimes rupture, which means that they break open and spill out.    ?Ectopic pregnancy – An ectopic pregnancy is a pregnancy that develops outside the uterus.    Should I see a doctor or nurse?  Yes. If you have sudden or severe abdominal pain, call your doctor or nurse or go to the hospital right away. Depending on the cause of your pain, you might need immediate treatment.    Will I need tests?  Probably. The doctor or nurse will ask about your symptoms, including where your pain is and what it feels like. The location of the pain can be an important clue to the cause.    Your doctor will ask about your current and past medical conditions, and do a physical exam. They might do repeat exams over time to follow your symptoms.    Your doctor will decide which tests you should have based on your symptoms and individual situation. The tests might include:    ?Blood tests    ?Urine tests    ?X-rays    ?An ultrasound, CT scan, or other imaging test – Imaging tests create pictures of the inside of the body.    How is abdominal pain treated?  Treatment depends on what's causing the pain. It might include 1 or more of the following:    ?Fluids given by IV    ?Pain medicines    ?Antibiotic medicines to treat an infection    ?Other medicines to treat other medical conditions    ?Surgery

## 2020-02-26 LAB
CULTURE RESULTS: SIGNIFICANT CHANGE UP
SPECIMEN SOURCE: SIGNIFICANT CHANGE UP

## 2021-06-15 NOTE — DISCHARGE NOTE PROVIDER - NSDCFUADDAPPT_GEN_ALL_CORE_FT
clears
A DISCHARGE FOLLOW UP APPOINTMENT HAS BEEN SCHEDULED FOR YOU WITH  Physician: JenPulmonologist  Address: [address] [place] [address]  Phone Number: [phone]    Date and Time of appointment: Friday 4/5/19 at 11:00

## 2022-01-01 NOTE — DISCHARGE NOTE NURSING/CASE MANAGEMENT/SOCIAL WORK - NSDCPETBCESMAN_GEN_ALL_CORE
If you are a smoker, it is important for your health to stop smoking. Please be aware that second hand smoke is also harmful. initiate/review safe skin-to-skin/initiate/review hand expression/initiate/review techniques for position and latch/post discharge community resources provided/review techniques to increase milk supply/initiate/review breast massage/compression/reviewed components of an effective feeding and at least 8 effective feedings per day required/reviewed importance of monitoring infant diapers, the breastfeeding log, and minimum output each day/reviewed strategies to transition to breastfeeding only/reviewed benefits and recommendations for rooming in/reviewed feeding on demand/by cue at least 8 times a day

## 2024-05-04 NOTE — ED ADULT TRIAGE NOTE - NS ED TRIAGE AVPU SCALE
Alert-The patient is alert, awake and responds to voice. The patient is oriented to time, place, and person. The triage nurse is able to obtain subjective information. Pt. received awake, alert, ox3, room air, no c/o pain

## 2024-08-01 NOTE — ED ADULT NURSE NOTE - NSFALLRSKOUTCOME_ED_ALL_ED
Ropinirole 0.25mg daily  Advanced directive information  Reschedule colonoscopy  Keep all appointments with nephrology  Schedule eye exam  Follow-up 6 months  
Fall with Harm Risk

## 2024-09-25 NOTE — PHYSICAL THERAPY INITIAL EVALUATION ADULT - STANDING BALANCE: STATIC
I left a message for Jodi, patients sister to call me regarding the Cardiology Referral. I will give Jodi    [facility] Cardiology   [facility]  [address]  [address]  [place] [address]  [phone] -- appt line  [phone] -- fax       good balance

## 2024-12-12 NOTE — ED ADULT NURSE NOTE - OBJECTIVE STATEMENT
family family family received pt AOx4 in [facility], c/o RLQ abd pain intermittent that comes and goes and travels all over belly, started last night. mild nausea, denies fevers, vomiting, urianry complaints, diarrhea, constipation, resp even unlabored, in no distress, MAEx4, neuro intact. will continue to monitor family family family family family

## 2025-01-21 ENCOUNTER — OFFICE (OUTPATIENT)
Dept: URBAN - METROPOLITAN AREA CLINIC 112 | Facility: CLINIC | Age: 80
Setting detail: OPHTHALMOLOGY
End: 2025-01-21
Payer: MEDICARE

## 2025-01-21 DIAGNOSIS — H04.123: ICD-10-CM

## 2025-01-21 DIAGNOSIS — H01.012: ICD-10-CM

## 2025-01-21 DIAGNOSIS — H35.373: ICD-10-CM

## 2025-01-21 DIAGNOSIS — H04.121: ICD-10-CM

## 2025-01-21 DIAGNOSIS — H43.393: ICD-10-CM

## 2025-01-21 DIAGNOSIS — E11.9: ICD-10-CM

## 2025-01-21 DIAGNOSIS — H10.45: ICD-10-CM

## 2025-01-21 DIAGNOSIS — H04.122: ICD-10-CM

## 2025-01-21 DIAGNOSIS — Z96.1: ICD-10-CM

## 2025-01-21 DIAGNOSIS — H35.033: ICD-10-CM

## 2025-01-21 PROCEDURE — 92002 INTRM OPH EXAM NEW PATIENT: CPT | Performed by: OPHTHALMOLOGY

## 2025-01-21 PROCEDURE — 92083 EXTENDED VISUAL FIELD XM: CPT | Performed by: OPHTHALMOLOGY

## 2025-01-21 PROCEDURE — 92250 FUNDUS PHOTOGRAPHY W/I&R: CPT | Performed by: OPHTHALMOLOGY

## 2025-01-21 ASSESSMENT — LID EXAM ASSESSMENTS
OS_BLEPHARITIS: 1+
OD_BLEPHARITIS: 1+

## 2025-01-21 ASSESSMENT — REFRACTION_MANIFEST
OS_VA1: 20/20
OD_VA2: 20/20
OS_CYLINDER: -1.00
OD_ADD: +2.00
OS_ADD: +2.00
OD_VA1: 20/20
OD_AXIS: 077
OS_VA2: 20/20
OS_AXIS: 091
OS_SPHERE: +1.25
OD_VA2: 20/20
OD_SPHERE: +1.00
OD_AXIS: 077
OD_CYLINDER: -0.75
OD_VA1: 20/20
OS_VA2: 20/20
OD_SPHERE: +1.00
OS_CYLINDER: -1.00
OS_VA1: 20/20
OD_CYLINDER: -0.75
OD_ADD: +2.00
OS_SPHERE: +1.25
OS_AXIS: 091
OS_ADD: +2.00

## 2025-01-21 ASSESSMENT — REFRACTION_CURRENTRX
OD_ADD: +2.75
OS_CYLINDER: -0.50
OS_OVR_VA: 20/
OS_ADD: +2.75
OS_OVR_VA: 20/
OS_AXIS: 83
OD_SPHERE: +0.50
OD_OVR_VA: 20/
OD_OVR_VA: 20/
OS_SPHERE: +2.00
OD_SPHERE: +2.00
OS_VPRISM_DIRECTION: PROGS
OS_SPHERE: +1.00
OD_AXIS: 77
OD_CYLINDER: -0.50
OD_VPRISM_DIRECTION: PROGS

## 2025-01-21 ASSESSMENT — SUPERFICIAL PUNCTATE KERATITIS (SPK)
OS_SPK: 1+
OD_SPK: 1+

## 2025-01-21 ASSESSMENT — TONOMETRY
OS_IOP_MMHG: 20
OD_IOP_MMHG: 21

## 2025-01-21 ASSESSMENT — KERATOMETRY
OS_K1POWER_DIOPTERS: 42.50
OD_K1POWER_DIOPTERS: 43.00
OD_AXISANGLE_DEGREES: 161
METHOD_AUTO_MANUAL: AUTO
OS_K2POWER_DIOPTERS: 43.75
OD_K2POWER_DIOPTERS: 43.75
OS_AXISANGLE_DEGREES: 004

## 2025-01-21 ASSESSMENT — VISUAL ACUITY
OD_BCVA: 20/25-1
OS_BCVA: 20/20-1

## 2025-01-21 ASSESSMENT — CONFRONTATIONAL VISUAL FIELD TEST (CVF)
OD_FINDINGS: FULL
OS_FINDINGS: FULL

## 2025-01-21 ASSESSMENT — REFRACTION_AUTOREFRACTION
OD_CYLINDER: -1.50
OS_CYLINDER: -+2.25
OS_SPHERE: +1.50
OD_SPHERE: +1.00
OS_AXIS: 089
OD_AXIS: 091

## 2025-02-12 NOTE — PROGRESS NOTE ADULT - PROBLEM SELECTOR PLAN 3
No sports/gym
1. Add IV Tylenol q8hrs x3 doses as adjunct, and to minimize opioid requirements.  Pain service  [phone]  Text page via [email or web address]
1. Gas pain suspected  - Add IV Tylenol x1 dose now  - Oral Tylenol q6hrs thereafter, to minimize opioid requirements.  Pain service  [phone]  Text page via [employer].
controlled,cont meds
controlled,cont meds

## 2025-02-17 ENCOUNTER — OFFICE (OUTPATIENT)
Dept: URBAN - METROPOLITAN AREA CLINIC 112 | Facility: CLINIC | Age: 80
Setting detail: OPHTHALMOLOGY
End: 2025-02-17
Payer: MEDICARE

## 2025-02-17 DIAGNOSIS — H10.45: ICD-10-CM

## 2025-02-17 DIAGNOSIS — H01.012: ICD-10-CM

## 2025-02-17 DIAGNOSIS — Z96.1: ICD-10-CM

## 2025-02-17 DIAGNOSIS — H43.393: ICD-10-CM

## 2025-02-17 DIAGNOSIS — E11.9: ICD-10-CM

## 2025-02-17 DIAGNOSIS — H04.123: ICD-10-CM

## 2025-02-17 DIAGNOSIS — H35.373: ICD-10-CM

## 2025-02-17 DIAGNOSIS — H04.122: ICD-10-CM

## 2025-02-17 DIAGNOSIS — H04.121: ICD-10-CM

## 2025-02-17 DIAGNOSIS — H35.033: ICD-10-CM

## 2025-02-17 PROCEDURE — 92012 INTRM OPH EXAM EST PATIENT: CPT | Performed by: OPHTHALMOLOGY

## 2025-02-17 ASSESSMENT — VISUAL ACUITY
OS_BCVA: 20/20-1
OD_BCVA: 20/25+1

## 2025-02-17 ASSESSMENT — KERATOMETRY
OD_K1POWER_DIOPTERS: 42.75
OS_K1POWER_DIOPTERS: 42.75
METHOD_AUTO_MANUAL: AUTO
OD_K2POWER_DIOPTERS: 43.50
OS_K2POWER_DIOPTERS: 43.75
OD_AXISANGLE_DEGREES: 162
OS_AXISANGLE_DEGREES: 003

## 2025-02-17 ASSESSMENT — REFRACTION_CURRENTRX
OS_OVR_VA: 20/
OD_AXIS: 77
OS_SPHERE: +1.00
OD_SPHERE: +1.00
OS_ADD: +2.00
OD_CYLINDER: -0.75
OD_SPHERE: +0.50
OD_ADD: +2.00
OD_AXIS: 090
OD_OVR_VA: 20/
OS_VPRISM_DIRECTION: PROGS
OS_ADD: +2.75
OS_AXIS: 83
OD_OVR_VA: 20/
OD_ADD: +2.75
OS_CYLINDER: -1.00
OS_VPRISM_DIRECTION: PROGS
OS_SPHERE: +1.25
OD_CYLINDER: -0.50
OD_SPHERE: +2.00
OS_SPHERE: +2.00
OS_OVR_VA: 20/
OD_OVR_VA: 20/
OD_VPRISM_DIRECTION: PROGS
OS_CYLINDER: -0.50
OD_VPRISM_DIRECTION: PROGS
OS_AXIS: 093
OS_OVR_VA: 20/

## 2025-02-17 ASSESSMENT — REFRACTION_AUTOREFRACTION
OD_SPHERE: +1.00
OS_AXIS: 088
OS_SPHERE: +1.00
OD_AXIS: 082
OS_CYLINDER: -2.00
OD_CYLINDER: -1.50

## 2025-02-17 ASSESSMENT — REFRACTION_MANIFEST
OD_CYLINDER: -0.75
OD_VA1: 20/20
OS_VA2: 20/20
OS_VA2: 20/20
OD_VA1: 20/20
OS_VA1: 20/20
OD_AXIS: 077
OS_CYLINDER: -1.00
OD_ADD: +2.00
OS_CYLINDER: -1.00
OS_ADD: +2.25
OD_SPHERE: +1.00
OD_CYLINDER: -0.75
OS_AXIS: 091
OD_SPHERE: +1.00
OS_VA1: 20/20
OS_SPHERE: +1.25
OS_AXIS: 091
OD_VA2: 20/20
OD_ADD: +2.25
OS_ADD: +2.00
OD_AXIS: 077
OS_SPHERE: +1.25
OD_VA2: 20/20

## 2025-02-17 ASSESSMENT — LID EXAM ASSESSMENTS
OS_BLEPHARITIS: 1+
OD_BLEPHARITIS: 1+

## 2025-02-17 ASSESSMENT — CONFRONTATIONAL VISUAL FIELD TEST (CVF)
OS_FINDINGS: FULL
OD_FINDINGS: FULL

## 2025-02-17 ASSESSMENT — TONOMETRY
OS_IOP_MMHG: 16
OD_IOP_MMHG: 17

## 2025-02-17 ASSESSMENT — SUPERFICIAL PUNCTATE KERATITIS (SPK)
OS_SPK: 1+
OD_SPK: 1+

## 2025-03-17 DIAGNOSIS — M25.561 PAIN IN RIGHT KNEE: ICD-10-CM

## 2025-03-18 ENCOUNTER — APPOINTMENT (OUTPATIENT)
Dept: ORTHOPEDIC SURGERY | Facility: CLINIC | Age: 80
End: 2025-03-18
Payer: MEDICARE

## 2025-03-18 VITALS
HEIGHT: 69 IN | HEART RATE: 77 BPM | WEIGHT: 202 LBS | SYSTOLIC BLOOD PRESSURE: 121 MMHG | BODY MASS INDEX: 29.92 KG/M2 | DIASTOLIC BLOOD PRESSURE: 64 MMHG

## 2025-03-18 DIAGNOSIS — M17.11 UNILATERAL PRIMARY OSTEOARTHRITIS, RIGHT KNEE: ICD-10-CM

## 2025-03-18 PROCEDURE — 99204 OFFICE O/P NEW MOD 45 MIN: CPT | Mod: 25

## 2025-03-18 PROCEDURE — 73564 X-RAY EXAM KNEE 4 OR MORE: CPT | Mod: RT

## 2025-03-18 PROCEDURE — 20610 DRAIN/INJ JOINT/BURSA W/O US: CPT | Mod: RT

## 2025-03-18 RX ORDER — MELOXICAM 15 MG/1
15 TABLET ORAL
Qty: 30 | Refills: 0 | Status: ACTIVE | COMMUNITY
Start: 2025-03-18 | End: 1900-01-01
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
